# Patient Record
Sex: MALE | Race: WHITE | Employment: FULL TIME | URBAN - METROPOLITAN AREA
[De-identification: names, ages, dates, MRNs, and addresses within clinical notes are randomized per-mention and may not be internally consistent; named-entity substitution may affect disease eponyms.]

---

## 2021-06-06 ENCOUNTER — APPOINTMENT (OUTPATIENT)
Dept: GENERAL RADIOLOGY | Facility: CLINIC | Age: 61
DRG: 896 | End: 2021-06-06
Attending: EMERGENCY MEDICINE
Payer: COMMERCIAL

## 2021-06-06 ENCOUNTER — HOSPITAL ENCOUNTER (INPATIENT)
Facility: CLINIC | Age: 61
LOS: 1 days | Discharge: OTHER INSTITUTION | DRG: 896 | End: 2021-06-07
Attending: EMERGENCY MEDICINE | Admitting: INTERNAL MEDICINE
Payer: COMMERCIAL

## 2021-06-06 ENCOUNTER — APPOINTMENT (OUTPATIENT)
Dept: CT IMAGING | Facility: CLINIC | Age: 61
DRG: 896 | End: 2021-06-06
Attending: EMERGENCY MEDICINE
Payer: COMMERCIAL

## 2021-06-06 DIAGNOSIS — N17.9 ACUTE KIDNEY INJURY (H): ICD-10-CM

## 2021-06-06 DIAGNOSIS — R55 SYNCOPE, UNSPECIFIED SYNCOPE TYPE: ICD-10-CM

## 2021-06-06 DIAGNOSIS — J96.01 ACUTE RESPIRATORY FAILURE WITH HYPOXIA (H): ICD-10-CM

## 2021-06-06 DIAGNOSIS — F10.929 ALCOHOLIC INTOXICATION WITH COMPLICATION (H): ICD-10-CM

## 2021-06-06 DIAGNOSIS — J18.9 PNEUMONIA OF BOTH LOWER LOBES DUE TO INFECTIOUS ORGANISM: ICD-10-CM

## 2021-06-06 LAB
ALBUMIN SERPL-MCNC: 3.7 G/DL (ref 3.4–5)
ALP SERPL-CCNC: 69 U/L (ref 40–150)
ALT SERPL W P-5'-P-CCNC: 35 U/L (ref 0–70)
ANION GAP SERPL CALCULATED.3IONS-SCNC: 9 MMOL/L (ref 3–14)
AST SERPL W P-5'-P-CCNC: 26 U/L (ref 0–45)
BASOPHILS # BLD AUTO: 0 10E9/L (ref 0–0.2)
BASOPHILS NFR BLD AUTO: 0.5 %
BILIRUB SERPL-MCNC: 0.3 MG/DL (ref 0.2–1.3)
BUN SERPL-MCNC: 24 MG/DL (ref 7–30)
CALCIUM SERPL-MCNC: 8.6 MG/DL (ref 8.5–10.1)
CHLORIDE SERPL-SCNC: 100 MMOL/L (ref 94–109)
CK SERPL-CCNC: 304 U/L (ref 30–300)
CO2 SERPL-SCNC: 24 MMOL/L (ref 20–32)
CREAT SERPL-MCNC: 1.67 MG/DL (ref 0.66–1.25)
DIFFERENTIAL METHOD BLD: ABNORMAL
EOSINOPHIL # BLD AUTO: 0 10E9/L (ref 0–0.7)
EOSINOPHIL NFR BLD AUTO: 0.6 %
ERYTHROCYTE [DISTWIDTH] IN BLOOD BY AUTOMATED COUNT: 11.8 % (ref 10–15)
ETHANOL SERPL-MCNC: 0.35 G/DL
GFR SERPL CREATININE-BSD FRML MDRD: 43 ML/MIN/{1.73_M2}
GLUCOSE BLDC GLUCOMTR-MCNC: 115 MG/DL (ref 70–99)
GLUCOSE SERPL-MCNC: 117 MG/DL (ref 70–99)
HCT VFR BLD AUTO: 38.4 % (ref 40–53)
HGB BLD-MCNC: 13.4 G/DL (ref 13.3–17.7)
IMM GRANULOCYTES # BLD: 0 10E9/L (ref 0–0.4)
IMM GRANULOCYTES NFR BLD: 0.3 %
LACTATE BLD-SCNC: 2 MMOL/L (ref 0.7–2)
LYMPHOCYTES # BLD AUTO: 1.8 10E9/L (ref 0.8–5.3)
LYMPHOCYTES NFR BLD AUTO: 29.3 %
MAGNESIUM SERPL-MCNC: 2 MG/DL (ref 1.6–2.3)
MCH RBC QN AUTO: 32.5 PG (ref 26.5–33)
MCHC RBC AUTO-ENTMCNC: 34.9 G/DL (ref 31.5–36.5)
MCV RBC AUTO: 93 FL (ref 78–100)
MONOCYTES # BLD AUTO: 0.6 10E9/L (ref 0–1.3)
MONOCYTES NFR BLD AUTO: 9.9 %
NEUTROPHILS # BLD AUTO: 3.7 10E9/L (ref 1.6–8.3)
NEUTROPHILS NFR BLD AUTO: 59.4 %
NRBC # BLD AUTO: 0 10*3/UL
NRBC BLD AUTO-RTO: 0 /100
PLATELET # BLD AUTO: 214 10E9/L (ref 150–450)
POTASSIUM SERPL-SCNC: 3.7 MMOL/L (ref 3.4–5.3)
PROT SERPL-MCNC: 7 G/DL (ref 6.8–8.8)
RBC # BLD AUTO: 4.12 10E12/L (ref 4.4–5.9)
SODIUM SERPL-SCNC: 133 MMOL/L (ref 133–144)
TROPONIN I SERPL-MCNC: <0.015 UG/L (ref 0–0.04)
WBC # BLD AUTO: 6.3 10E9/L (ref 4–11)

## 2021-06-06 PROCEDURE — 999N001017 HC STATISTIC GLUCOSE BY METER IP

## 2021-06-06 PROCEDURE — 87635 SARS-COV-2 COVID-19 AMP PRB: CPT | Performed by: EMERGENCY MEDICINE

## 2021-06-06 PROCEDURE — 70450 CT HEAD/BRAIN W/O DYE: CPT

## 2021-06-06 PROCEDURE — 82550 ASSAY OF CK (CPK): CPT | Performed by: EMERGENCY MEDICINE

## 2021-06-06 PROCEDURE — 87040 BLOOD CULTURE FOR BACTERIA: CPT | Performed by: EMERGENCY MEDICINE

## 2021-06-06 PROCEDURE — 36415 COLL VENOUS BLD VENIPUNCTURE: CPT | Performed by: EMERGENCY MEDICINE

## 2021-06-06 PROCEDURE — 99285 EMERGENCY DEPT VISIT HI MDM: CPT | Mod: 25

## 2021-06-06 PROCEDURE — 71260 CT THORAX DX C+: CPT

## 2021-06-06 PROCEDURE — 83735 ASSAY OF MAGNESIUM: CPT | Performed by: EMERGENCY MEDICINE

## 2021-06-06 PROCEDURE — 85025 COMPLETE CBC W/AUTO DIFF WBC: CPT | Performed by: EMERGENCY MEDICINE

## 2021-06-06 PROCEDURE — 80053 COMPREHEN METABOLIC PANEL: CPT | Performed by: EMERGENCY MEDICINE

## 2021-06-06 PROCEDURE — 82077 ASSAY SPEC XCP UR&BREATH IA: CPT | Performed by: EMERGENCY MEDICINE

## 2021-06-06 PROCEDURE — 83605 ASSAY OF LACTIC ACID: CPT | Performed by: EMERGENCY MEDICINE

## 2021-06-06 PROCEDURE — 99223 1ST HOSP IP/OBS HIGH 75: CPT | Mod: AI | Performed by: INTERNAL MEDICINE

## 2021-06-06 PROCEDURE — C9803 HOPD COVID-19 SPEC COLLECT: HCPCS

## 2021-06-06 PROCEDURE — 96361 HYDRATE IV INFUSION ADD-ON: CPT

## 2021-06-06 PROCEDURE — 71045 X-RAY EXAM CHEST 1 VIEW: CPT

## 2021-06-06 PROCEDURE — 93005 ELECTROCARDIOGRAM TRACING: CPT

## 2021-06-06 PROCEDURE — 250N000009 HC RX 250: Performed by: EMERGENCY MEDICINE

## 2021-06-06 PROCEDURE — 83880 ASSAY OF NATRIURETIC PEPTIDE: CPT | Performed by: EMERGENCY MEDICINE

## 2021-06-06 PROCEDURE — 258N000003 HC RX IP 258 OP 636: Performed by: EMERGENCY MEDICINE

## 2021-06-06 PROCEDURE — 81001 URINALYSIS AUTO W/SCOPE: CPT | Performed by: EMERGENCY MEDICINE

## 2021-06-06 PROCEDURE — 84484 ASSAY OF TROPONIN QUANT: CPT | Performed by: EMERGENCY MEDICINE

## 2021-06-06 PROCEDURE — 250N000011 HC RX IP 250 OP 636: Performed by: EMERGENCY MEDICINE

## 2021-06-06 RX ORDER — AZITHROMYCIN 500 MG/5ML
500 INJECTION, POWDER, LYOPHILIZED, FOR SOLUTION INTRAVENOUS ONCE
Status: DISCONTINUED | OUTPATIENT
Start: 2021-06-06 | End: 2021-06-06

## 2021-06-06 RX ORDER — IOPAMIDOL 755 MG/ML
500 INJECTION, SOLUTION INTRAVASCULAR ONCE
Status: COMPLETED | OUTPATIENT
Start: 2021-06-06 | End: 2021-06-06

## 2021-06-06 RX ORDER — CEFTRIAXONE 2 G/1
2 INJECTION, POWDER, FOR SOLUTION INTRAMUSCULAR; INTRAVENOUS ONCE
Status: DISCONTINUED | OUTPATIENT
Start: 2021-06-06 | End: 2021-06-06

## 2021-06-06 RX ORDER — SODIUM CHLORIDE 9 MG/ML
INJECTION, SOLUTION INTRAVENOUS CONTINUOUS
Status: DISCONTINUED | OUTPATIENT
Start: 2021-06-06 | End: 2021-06-07 | Stop reason: HOSPADM

## 2021-06-06 RX ADMIN — SODIUM CHLORIDE 1000 ML: 9 INJECTION, SOLUTION INTRAVENOUS at 19:41

## 2021-06-06 RX ADMIN — SODIUM CHLORIDE 1000 ML: 9 INJECTION, SOLUTION INTRAVENOUS at 22:35

## 2021-06-06 RX ADMIN — SODIUM CHLORIDE 1000 ML: 9 INJECTION, SOLUTION INTRAVENOUS at 20:33

## 2021-06-06 RX ADMIN — IOPAMIDOL 100 ML: 755 INJECTION, SOLUTION INTRAVENOUS at 22:56

## 2021-06-06 RX ADMIN — SODIUM CHLORIDE 1000 ML: 9 INJECTION, SOLUTION INTRAVENOUS at 19:05

## 2021-06-06 RX ADMIN — SODIUM CHLORIDE 90 ML: 9 INJECTION, SOLUTION INTRAVENOUS at 22:58

## 2021-06-06 ASSESSMENT — ENCOUNTER SYMPTOMS: ACTIVITY CHANGE: 1

## 2021-06-06 NOTE — ED TRIAGE NOTES
Pt has been drinking today, had a box of wine and some vodka, girlfriend called EMS due to unresponsiveness. EMS states pt uncooperative at times. BS: 128. Slurred speech noted. ABC's intact, alert.

## 2021-06-06 NOTE — ED PROVIDER NOTES
History   Chief Complaint:  Alcohol Intoxication       HPI   Elfego Pickens is a 61 year old male with history of hypertension, hyperlipidemia, asthma, and diverticulosis who presents with alcohol intoxication. Patient has been drinking today with his friends. He had a box of wine and some vodka. He then became unresponsive so his wife called EMS. Here in the ED, patient denies any pain.    His wife later arrived and states that the patient has been out in the heat quite frequently over the last few days.  He has not been eating and drinking all that well outside of alcohol use.  They have had some stressful things going on recently.  The patient went out to celebrate successful house sale and likely drank too much according to his wife.  She brought him home and when getting out of the car, he had a syncopal episode and went to the ground.  He did not have any injuries from this.    Review of Systems   Constitutional: Positive for activity change. Negative for chills and fever.   Respiratory: Positive for cough.    Cardiovascular: Negative for chest pain.   Gastrointestinal: Negative for abdominal pain, diarrhea and vomiting.   Neurological: Positive for syncope. Negative for weakness, numbness and headaches.   Psychiatric/Behavioral: Positive for confusion.   All other systems reviewed and are negative.      Allergies:  No Known Allergies    Medications:  Zyrtec   ASA  Xarelto   Revatio   Flexeril   Norvasc  Neurontin   Zocor  Prinzide     Past Medical History:    Hypertension  Asthma   Hyperlipidemia   Diverticulosis   Obesity   Osteoarthritis     Past Surgical History:    Knee surgery   Vasectomy   Varicose vein   Corneal surgery     Social History:  Patient presents to the ED alone.  Patient lives at home with his wife.    Physical Exam     Patient Vitals for the past 24 hrs:   BP Pulse Resp SpO2   06/06/21 2300 90/52 79 -- --   06/06/21 2235 -- 76 12 95 %   06/06/21 2230 (!) 80/50 76 17 --   06/06/21 2200  (!) 72/44 73 14 (!) 89 %   06/06/21 2115 -- 76 11 91 %   06/06/21 2100 (!) 70/44 75 18 90 %   06/06/21 2045 (!) 74/40 75 29 90 %   06/06/21 2030 -- 78 21 97 %   06/06/21 2015 (!) 87/50 82 26 90 %   06/06/21 2000 (!) 72/36 78 27 98 %   06/06/21 1945 (!) 79/47 80 19 95 %   06/06/21 1930 (!) 81/41 79 22 94 %   06/06/21 1915 (!) 68/36 77 20 93 %   06/06/21 1900 (!) 72/42 81 20 --   06/06/21 1845 (!) 85/53 84 -- 91 %   06/06/21 1842 92/47 87 16 (!) 88 %       Physical Exam  Constitutional: Nontoxic appearing.  HEENT: Atraumatic.  PERRL.  EOMI.  Moist mucous membranes.  Neck: Soft.  Supple.  No JVD.  Cardiac: Regular rate and rhythm.  No murmur or rub.  Respiratory: Clear to auscultation bilaterally.  No respiratory distress.    Abdomen: Soft and nontender.  Nondistended.  Musculoskeletal: No edema.  Normal range of motion.  Neurologic: Alert and oriented to self and place but somewhat confused and appears intoxicated.  No facial drooping.  Normal speech.  5/5 strength in bilateral upper and lower extremities.  Sensation to light touch intact throughout.    Skin: No rashes.  No edema.  Psych: He appears intoxicated and somewhat agitated at time but calm is easily with redirection.  Does not appear to be responding to internal stimuli.  No thoughts of self-harm.      Emergency Department Course     ECG:  ECG taken at 1848, ECG read at 1858  Normal sinus rhythm.  Rate 83 bpm. AZ interval 190 ms. QRS duration 88 ms. QT/QTc 356/418 ms. P-R-T axes 50 32 28.     Imaging:  XR chest port 1 view:  Heart size and pulmonary vessels normal. Lung volumes are low clear. Reading per radiology.    CT chest abdomen pelvis with contrast:  1.  Mild dependent infiltrates at lung bases favored to be discoid atelectasis though mild pneumonia or developing aspiration possible.  2.  There is mild interstitial pulmonary edema seen at lung apices.  3.  Nothing obstructive or inflammatory involving bowel.  4.  Cholelithiasis. Reading per radiology.      CT head without contrast:  1. No acute intracranial process. Reading per radiology.     Laboratory:  CBC: WBC: 6.3, HGB: 13.4, PLT: 214  CMP: Glucose 117 (H), GFR: 43 (low), Creatinine: 1.67 (H) o/w WNL   Troponin (Collected 1907): <0.015  Lactic acid (Resulted 2050): 2.0  Glucose by meter (2008): 115 (H)  Magnesium: 2.0  CK total: 304 (H)  Alcohol ethyl: 0.35 (H!)  Blood Cultures x2: Pending    Emergency Department Course:    Reviewed:  nursing notes, vitals, past medical history and care everywhere    Assessments:    1850 Initial assessment     2130 I rechecked the patient and discussed the results of her workup thus far.     2336 I rechecked the patient and discussed the results of her workup thus far.     Consults:      I spoke with the hospitalist Dr. Blanco, who accepts the patient for admission.    Interventions:  1905 NS 1L IV Bolus   1941 NS 1L IV Bolus   2033 NS 1L IV Bolus   2235 NS 1L IV Bolus     Disposition:  The patient was admitted to the hospital under the care of Dr. Blanco.       Impression & Plan   Medical Decision Making:  Elfego Pickens is a 61-year-old man is afebrile but quite hypertensive.  A broad work-up was initiated and IV fluids were initiated.  He certainly intoxicated with a serum alcohol of 0.35.  He is actually awake answering questions.  He is somewhat confused initially, however, he sobered up quite significantly while in the emergency department and is answering questions appropriately but still mildly intoxicated.  His wife is present.  He has no pain or complaints at this time other than a slight cough over the last month been worsening.  He is also been in the heat and not eating and drinking very well, outside of his alcohol use.  Lab work-up is noted as above.  He has no leukocytosis.  He has no lactic acidosis.  His kidney function appears to have doubled from his last check a year ago.  He required 4 L of IV fluids and is now normotensive.  Do not believe he requires  pressor support at this time as he has no fever, leukocytosis, or tachycardia or lactic acidosis.  I did obtain CT scan imaging of his chest, abdomen, pelvis and head given the persistent hypotension.  There is potential aspiration pneumonia bilaterally in his lungs.  I discussed plan for IV antibiotics and admission to the hospital as he is requiring 2 L of nasal cannula oxygen.  He is in agreement with this plan.  I spoke with hospitalist service who accepts him to the observation unit and he was in stable condition at time of admission.    Diagnosis:    ICD-10-CM    1. Acute respiratory failure with hypoxia (H)  J96.01 Blood culture     Blood culture     UA with Microscopic     Nt probnp inpatient     Symptomatic SARS-CoV-2 COVID-19 Virus (Coronavirus) by PCR     Comprehensive metabolic panel     CBC with platelets   2. Pneumonia of both lower lobes due to infectious organism  J18.9 amoxicillin-clavulanate (AUGMENTIN) 875-125 MG tablet   3. Acute kidney injury (H)  N17.9    4. Syncope, unspecified syncope type  R55    5. Alcoholic intoxication with complication (H)  F10.929          Scribe Disclosure:  I, Pino Pickering, am serving as a scribe at 6:49 PM on 6/6/2021 to document services personally perfrmed by Alberto Barlow MD based on my observations and the provider's statements to me.              Alberto Barlow MD  06/07/21 [id number]

## 2021-06-07 VITALS
BODY MASS INDEX: 37.83 KG/M2 | DIASTOLIC BLOOD PRESSURE: 63 MMHG | HEART RATE: 95 BPM | RESPIRATION RATE: 20 BRPM | HEIGHT: 73 IN | WEIGHT: 285.4 LBS | SYSTOLIC BLOOD PRESSURE: 130 MMHG | OXYGEN SATURATION: 94 % | TEMPERATURE: 98.4 F

## 2021-06-07 PROBLEM — J18.9 PNEUMONIA OF BOTH LOWER LOBES DUE TO INFECTIOUS ORGANISM: Status: ACTIVE | Noted: 2021-06-07

## 2021-06-07 PROBLEM — N17.9 ACUTE KIDNEY INJURY (H): Status: ACTIVE | Noted: 2021-06-07

## 2021-06-07 PROBLEM — F10.929 ALCOHOLIC INTOXICATION WITH COMPLICATION (H): Status: ACTIVE | Noted: 2021-06-07

## 2021-06-07 PROBLEM — J96.01 ACUTE RESPIRATORY FAILURE WITH HYPOXIA (H): Status: ACTIVE | Noted: 2021-06-07

## 2021-06-07 PROBLEM — R55 SYNCOPE, UNSPECIFIED SYNCOPE TYPE: Status: ACTIVE | Noted: 2021-06-07

## 2021-06-07 LAB
ALBUMIN SERPL-MCNC: 3.2 G/DL (ref 3.4–5)
ALBUMIN UR-MCNC: NEGATIVE MG/DL
ALP SERPL-CCNC: 64 U/L (ref 40–150)
ALT SERPL W P-5'-P-CCNC: 31 U/L (ref 0–70)
ANION GAP SERPL CALCULATED.3IONS-SCNC: 8 MMOL/L (ref 3–14)
APPEARANCE UR: CLEAR
AST SERPL W P-5'-P-CCNC: 24 U/L (ref 0–45)
BILIRUB SERPL-MCNC: 0.3 MG/DL (ref 0.2–1.3)
BILIRUB UR QL STRIP: NEGATIVE
BUN SERPL-MCNC: 21 MG/DL (ref 7–30)
CALCIUM SERPL-MCNC: 7.7 MG/DL (ref 8.5–10.1)
CHLORIDE SERPL-SCNC: 112 MMOL/L (ref 94–109)
CO2 SERPL-SCNC: 22 MMOL/L (ref 20–32)
COLOR UR AUTO: ABNORMAL
CREAT SERPL-MCNC: 1.09 MG/DL (ref 0.66–1.25)
ERYTHROCYTE [DISTWIDTH] IN BLOOD BY AUTOMATED COUNT: 12 % (ref 10–15)
GFR SERPL CREATININE-BSD FRML MDRD: 72 ML/MIN/{1.73_M2}
GLUCOSE SERPL-MCNC: 116 MG/DL (ref 70–99)
GLUCOSE UR STRIP-MCNC: NEGATIVE MG/DL
HCT VFR BLD AUTO: 38 % (ref 40–53)
HGB BLD-MCNC: 13 G/DL (ref 13.3–17.7)
HGB UR QL STRIP: NEGATIVE
INTERPRETATION ECG - MUSE: NORMAL
KETONES UR STRIP-MCNC: NEGATIVE MG/DL
LABORATORY COMMENT REPORT: NORMAL
LEUKOCYTE ESTERASE UR QL STRIP: NEGATIVE
MCH RBC QN AUTO: 32.6 PG (ref 26.5–33)
MCHC RBC AUTO-ENTMCNC: 34.2 G/DL (ref 31.5–36.5)
MCV RBC AUTO: 95 FL (ref 78–100)
MUCOUS THREADS #/AREA URNS LPF: PRESENT /LPF
NITRATE UR QL: NEGATIVE
NT-PROBNP SERPL-MCNC: 185 PG/ML (ref 0–900)
PH UR STRIP: 5 PH (ref 5–7)
PLATELET # BLD AUTO: 152 10E9/L (ref 150–450)
POTASSIUM SERPL-SCNC: 4 MMOL/L (ref 3.4–5.3)
PROT SERPL-MCNC: 6.4 G/DL (ref 6.8–8.8)
RBC # BLD AUTO: 3.99 10E12/L (ref 4.4–5.9)
RBC #/AREA URNS AUTO: 0 /HPF (ref 0–2)
SARS-COV-2 RNA RESP QL NAA+PROBE: NEGATIVE
SODIUM SERPL-SCNC: 142 MMOL/L (ref 133–144)
SOURCE: ABNORMAL
SP GR UR STRIP: 1.02 (ref 1–1.03)
SPECIMEN SOURCE: NORMAL
UROBILINOGEN UR STRIP-MCNC: NORMAL MG/DL (ref 0–2)
WBC # BLD AUTO: 7.4 10E9/L (ref 4–11)
WBC #/AREA URNS AUTO: <1 /HPF (ref 0–5)

## 2021-06-07 PROCEDURE — 36415 COLL VENOUS BLD VENIPUNCTURE: CPT | Performed by: INTERNAL MEDICINE

## 2021-06-07 PROCEDURE — 250N000013 HC RX MED GY IP 250 OP 250 PS 637: Performed by: INTERNAL MEDICINE

## 2021-06-07 PROCEDURE — 96374 THER/PROPH/DIAG INJ IV PUSH: CPT | Mod: 59

## 2021-06-07 PROCEDURE — 80053 COMPREHEN METABOLIC PANEL: CPT | Performed by: INTERNAL MEDICINE

## 2021-06-07 PROCEDURE — 258N000003 HC RX IP 258 OP 636: Performed by: INTERNAL MEDICINE

## 2021-06-07 PROCEDURE — 120N000001 HC R&B MED SURG/OB

## 2021-06-07 PROCEDURE — 250N000011 HC RX IP 250 OP 636: Performed by: INTERNAL MEDICINE

## 2021-06-07 PROCEDURE — 250N000011 HC RX IP 250 OP 636: Performed by: EMERGENCY MEDICINE

## 2021-06-07 PROCEDURE — 99239 HOSP IP/OBS DSCHRG MGMT >30: CPT | Performed by: INTERNAL MEDICINE

## 2021-06-07 PROCEDURE — 85027 COMPLETE CBC AUTOMATED: CPT | Performed by: INTERNAL MEDICINE

## 2021-06-07 RX ORDER — MULTIPLE VITAMINS W/ MINERALS TAB 9MG-400MCG
1 TAB ORAL DAILY
Status: DISCONTINUED | OUTPATIENT
Start: 2021-06-07 | End: 2021-06-07 | Stop reason: HOSPADM

## 2021-06-07 RX ORDER — CYCLOBENZAPRINE HCL 10 MG
10 TABLET ORAL 3 TIMES DAILY PRN
COMMUNITY

## 2021-06-07 RX ORDER — LANOLIN ALCOHOL/MO/W.PET/CERES
100 CREAM (GRAM) TOPICAL 3 TIMES DAILY
Status: DISCONTINUED | OUTPATIENT
Start: 2021-06-09 | End: 2021-06-07 | Stop reason: HOSPADM

## 2021-06-07 RX ORDER — AMLODIPINE BESYLATE 10 MG/1
10 TABLET ORAL DAILY
Status: ON HOLD | COMMUNITY
End: 2021-11-27

## 2021-06-07 RX ORDER — HALOPERIDOL 5 MG/ML
2.5-5 INJECTION INTRAMUSCULAR EVERY 6 HOURS PRN
Status: DISCONTINUED | OUTPATIENT
Start: 2021-06-07 | End: 2021-06-07 | Stop reason: HOSPADM

## 2021-06-07 RX ORDER — LORAZEPAM 1 MG/1
1-2 TABLET ORAL EVERY 30 MIN PRN
Status: DISCONTINUED | OUTPATIENT
Start: 2021-06-07 | End: 2021-06-07 | Stop reason: HOSPADM

## 2021-06-07 RX ORDER — AZITHROMYCIN 250 MG/1
250 TABLET, FILM COATED ORAL DAILY
Status: DISCONTINUED | OUTPATIENT
Start: 2021-06-07 | End: 2021-06-07 | Stop reason: HOSPADM

## 2021-06-07 RX ORDER — SIMVASTATIN 20 MG
20 TABLET ORAL AT BEDTIME
COMMUNITY

## 2021-06-07 RX ORDER — DULOXETINE 40 MG/1
1 CAPSULE, DELAYED RELEASE ORAL DAILY
COMMUNITY

## 2021-06-07 RX ORDER — AZITHROMYCIN 500 MG/5ML
500 INJECTION, POWDER, LYOPHILIZED, FOR SOLUTION INTRAVENOUS ONCE
Status: COMPLETED | OUTPATIENT
Start: 2021-06-07 | End: 2021-06-07

## 2021-06-07 RX ORDER — FOLIC ACID 1 MG/1
1 TABLET ORAL DAILY
Status: DISCONTINUED | OUTPATIENT
Start: 2021-06-07 | End: 2021-06-07 | Stop reason: HOSPADM

## 2021-06-07 RX ORDER — LANOLIN ALCOHOL/MO/W.PET/CERES
200 CREAM (GRAM) TOPICAL 3 TIMES DAILY
Status: DISCONTINUED | OUTPATIENT
Start: 2021-06-07 | End: 2021-06-07 | Stop reason: HOSPADM

## 2021-06-07 RX ORDER — LANOLIN ALCOHOL/MO/W.PET/CERES
100 CREAM (GRAM) TOPICAL DAILY
Status: DISCONTINUED | OUTPATIENT
Start: 2021-06-14 | End: 2021-06-07 | Stop reason: HOSPADM

## 2021-06-07 RX ORDER — GABAPENTIN 300 MG/1
900 CAPSULE ORAL 3 TIMES DAILY
COMMUNITY

## 2021-06-07 RX ORDER — OLANZAPINE 5 MG/1
5-10 TABLET, ORALLY DISINTEGRATING ORAL EVERY 6 HOURS PRN
Status: DISCONTINUED | OUTPATIENT
Start: 2021-06-07 | End: 2021-06-07 | Stop reason: HOSPADM

## 2021-06-07 RX ORDER — ONDANSETRON 2 MG/ML
4 INJECTION INTRAMUSCULAR; INTRAVENOUS EVERY 6 HOURS PRN
Status: DISCONTINUED | OUTPATIENT
Start: 2021-06-07 | End: 2021-06-07 | Stop reason: HOSPADM

## 2021-06-07 RX ORDER — SILDENAFIL CITRATE 20 MG/1
10-100 TABLET ORAL
COMMUNITY

## 2021-06-07 RX ORDER — DIPHENHYDRAMINE HCL 25 MG
25 CAPSULE ORAL DAILY PRN
COMMUNITY

## 2021-06-07 RX ORDER — OMEGA-3 FATTY ACIDS/FISH OIL 300-1000MG
1 CAPSULE ORAL DAILY
COMMUNITY

## 2021-06-07 RX ORDER — LISINOPRIL AND HYDROCHLOROTHIAZIDE 20; 25 MG/1; MG/1
1 TABLET ORAL DAILY
COMMUNITY

## 2021-06-07 RX ORDER — FLUMAZENIL 0.1 MG/ML
0.2 INJECTION, SOLUTION INTRAVENOUS
Status: DISCONTINUED | OUTPATIENT
Start: 2021-06-07 | End: 2021-06-07 | Stop reason: HOSPADM

## 2021-06-07 RX ORDER — ONDANSETRON 4 MG/1
4 TABLET, ORALLY DISINTEGRATING ORAL EVERY 6 HOURS PRN
Status: DISCONTINUED | OUTPATIENT
Start: 2021-06-07 | End: 2021-06-07 | Stop reason: HOSPADM

## 2021-06-07 RX ORDER — LIDOCAINE 40 MG/G
CREAM TOPICAL
Status: DISCONTINUED | OUTPATIENT
Start: 2021-06-07 | End: 2021-06-07 | Stop reason: HOSPADM

## 2021-06-07 RX ORDER — LORAZEPAM 2 MG/ML
1-2 INJECTION INTRAMUSCULAR EVERY 30 MIN PRN
Status: DISCONTINUED | OUTPATIENT
Start: 2021-06-07 | End: 2021-06-07 | Stop reason: HOSPADM

## 2021-06-07 RX ORDER — METOPROLOL SUCCINATE 100 MG/1
150 TABLET, EXTENDED RELEASE ORAL DAILY
COMMUNITY

## 2021-06-07 RX ADMIN — Medication 5 MG: at 01:54

## 2021-06-07 RX ADMIN — FOLIC ACID 1 MG: 1 TABLET ORAL at 08:38

## 2021-06-07 RX ADMIN — THIAMINE HCL TAB 100 MG 200 MG: 100 TAB at 08:38

## 2021-06-07 RX ADMIN — TAZOBACTAM SODIUM AND PIPERACILLIN SODIUM 3.38 G: 375; 3 INJECTION, SOLUTION INTRAVENOUS at 05:57

## 2021-06-07 RX ADMIN — TAZOBACTAM SODIUM AND PIPERACILLIN SODIUM 4.5 G: 500; 4 INJECTION, SOLUTION INTRAVENOUS at 00:25

## 2021-06-07 RX ADMIN — TAZOBACTAM SODIUM AND PIPERACILLIN SODIUM 3.38 G: 375; 3 INJECTION, SOLUTION INTRAVENOUS at 11:56

## 2021-06-07 RX ADMIN — AZITHROMYCIN MONOHYDRATE 500 MG: 500 INJECTION, POWDER, LYOPHILIZED, FOR SOLUTION INTRAVENOUS at 01:54

## 2021-06-07 RX ADMIN — THIAMINE HCL TAB 100 MG 200 MG: 100 TAB at 14:40

## 2021-06-07 RX ADMIN — AZITHROMYCIN MONOHYDRATE 250 MG: 250 TABLET ORAL at 14:40

## 2021-06-07 RX ADMIN — MULTIPLE VITAMINS W/ MINERALS TAB 1 TABLET: TAB at 08:38

## 2021-06-07 RX ADMIN — SODIUM CHLORIDE: 9 INJECTION, SOLUTION INTRAVENOUS at 01:49

## 2021-06-07 ASSESSMENT — ENCOUNTER SYMPTOMS
HEADACHES: 0
ABDOMINAL PAIN: 0
NUMBNESS: 0
FEVER: 0
CONFUSION: 1
COUGH: 1
CHILLS: 0
WEAKNESS: 0
DIARRHEA: 0
VOMITING: 0

## 2021-06-07 ASSESSMENT — ACTIVITIES OF DAILY LIVING (ADL)
ADLS_ACUITY_SCORE: 17

## 2021-06-07 ASSESSMENT — MIFFLIN-ST. JEOR: SCORE: 2148.45

## 2021-06-07 NOTE — PHARMACY-ADMISSION MEDICATION HISTORY
Admission medication history interview status for this patient is complete. See [facility] admission navigator for allergy information, prior to admission medications and immunization status.     Medication history interview done, indicate source(s): Patient  Medication history resources (including written lists, pill bottles, clinic record): Called [facility], Care Everywhere   Pharmacy: [facility]rientos/[address], [facility] for discharge prescriptions    Changes made to PTA medication list:  Added: all  Deleted: none  Changed: none    Actions taken by pharmacist (provider contacted, etc):None     Additional medication history information: Patient is a reliable historian. Fill history available for all scheduled prescription medications except duloxetine; however, duloxetine has recent Rx order shown in chart notes 5/2021. Patient did not specify doses of supplements in interview. Confirmed with patient that Xarelto is no longer active - last fill date 5/2020 from [facility].     Medication reconciliation/reorder completed by provider prior to medication history?  N     Prior to Admission medications    Medication Sig Last Dose Taking? Auth Provider   amLODIPine (NORVASC) 10 MG tablet Take 10 mg by mouth daily 6/6/2021 at AM Yes Unknown, Entered By History   Ascorbic Acid (VITAMIN C PO) Take 1 tablet by mouth daily 6/6/2021 at AM Yes Unknown, Entered By History   B Complex Vitamins (B COMPLEX PO) Take 1 tablet by mouth daily 6/6/2021 at AM Yes Unknown, Entered By History   cyclobenzaprine (FLEXERIL) 10 MG tablet Take 10 mg by mouth 3 times daily as needed for muscle spasms Past Month at 2 weeks ago Yes Unknown, Entered By History   diphenhydrAMINE (BENADRYL) 25 MG capsule Take 25 mg by mouth daily as needed for itching or allergies 6/6/2021 at Unknown time Yes Unknown, Entered By History   DULoxetine HCl 40 MG CPEP Take 1 capsule by mouth daily 6/6/2021 at AM Yes Unknown, Entered By History   gabapentin (NEURONTIN) 300 MG capsule Take  900 mg by mouth 3 times daily 6/6/2021 at 1200 Yes Unknown, Entered By History   lisinopril-hydrochlorothiazide (ZESTORETIC) 20-25 MG tablet Take 1 tablet by mouth daily 6/6/2021 at AM Yes Unknown, Entered By History   metoprolol succinate ER (TOPROL-XL) 100 MG 24 hr tablet Take 150 mg by mouth daily 6/5/2021 at PM Yes Unknown, Entered By History   omega 3 1000 MG CAPS Take 1 capsule by mouth daily 6/6/2021 at AM Yes Unknown, Entered By History   sildenafil (REVATIO) 20 MG tablet Take  mg by mouth once as needed (30-60 minutes prior to sexual intercourse) 6/5/2021 at Unknown time Yes Unknown, Entered By History   simvastatin (ZOCOR) 20 MG tablet Take 20 mg by mouth At Bedtime 6/5/2021 at PM Yes Unknown, Entered By History   VITAMIN D PO Take 1 tablet by mouth daily 6/6/2021 at AM Yes Unknown, Entered By History   ZINC SULFATE PO Take 1 tablet by mouth daily 6/6/2021 at AM Yes Unknown, Entered By History       Laney Skinner  PGY-1 Pharmacy Practice Resident

## 2021-06-07 NOTE — UTILIZATION REVIEW
"  [facility] Utilization Review  Admission Status; Secondary Review Determination     Admission Date: 6/6/2021  6:41 PM      Under the authority of the Utilization Management Committee, the utilization review process indicated a secondary review on the above patient.  The review outcome is based on review of the medical records, discussions with staff, and applying clinical experience noted on the date of the review.        (X) Observation Status Appropriate - This patient does not meet hospital inpatient criteria and is placed in observation status. If this patient's primary payer is Medicare and was admitted as an inpatient, Condition Code 44 should be used and patient status changed to \"observation\".   () Observation Status concurrent Review           RATIONALE FOR DETERMINATION   62 yo M with h/o hypertension, hyperlipidemia, asthma, diverticulosis, admitted with alcohol intoxication.  Patient had been drinking wine and vodka, became unresponsive and wife called EMS.  Patient has been hypotensive with acute hypoxic respiratory failure.  Chest x-ray unremarkable, CT chest abdomen pelvis shows pneumonia versus aspiration, head CT unremarkable.  Patient was initially combative and confused, now clearing and cooperative.  Started on CIWA protocol, IV fluid hydration.  Patient has had productive cough for the past 2 to 3 months, started on IV Zosyn and azithromycin, also developed acute kidney injury with elevated creatinine of 1.6.  Patient has received 4 L normal saline with profound hypotension in the emergency room.  Blood pressures now improving, weaned off oxygen.  Patient was acutely ill on admission but quickly improved after overnight stay, now ready for discharge, recommend change to observation status after single midnight hospital stay, communicated to Dr. Sewell      The severity of illness, intensity of service provided, expected LOS make the care appropriate for observation status at this " time.        The information on this document is developed by the utilization review team in order for the business office to ensure compliance.  This only denotes the appropriateness of proper admission status and does not reflect the quality of care rendered.         The definitions of Inpatient Status and Observation Status used in making the determination above are those provided in the CMS Coverage Manual, Chapter 1 and Chapter 6, section 70.4.      Sincerely,       Kym Logan MD  Physician Advisor  Utilization Review-[place]    Phone: [phone]

## 2021-06-07 NOTE — H&P
[facility]  Hospitalist Admission Note  Name: Elfego Pickens    MRN: [id number]  YOB: 1960    Age: 61 year old  Date of admission: 6/6/2021  Primary care provider: [facility], [place]    Chief Complaint:  Hypotension, RAFAEL, confusion    Assessment and Plan:     Elfego Pickens is a 61 year old male with PMH including hypertension, neuropathy and hyperlipidemia who was admitted on 6/7/2021 with significant confusion and subsequent unresponsiveness and was found to have acute alcohol intoxication with profound hypotension, acute kidney injury and acute hypoxic respiratory failure due to possible pneumonia.    1.  Acute toxic/metabolic encephalopathy: Patient had been drinking heavily earlier today.  He was quite confused on passed out.  Does not recall what happened got him to the emergency room.  This is likely due to acute alcohol intoxication in addition to his hypoxic respiratory failure.  See below for specific treatments.  His mental status is already improved.    2.  Acute alcohol intoxication: As above drinking heavily on the day of admission.  Blood alcohol 0.35.  Initially was somewhat combative and confused.  He is now clearing and cooperative.  States that he drinks 2 to 4 glasses of wine several days a week.  Admits that he drank far too much today but was celebrating selling his house.  No prior history of alcohol withdrawal.  We will start vitamins and monitor on CIWA but will not start gabapentin or Depakote.    3.  Acute hypoxic respiratory failure: Patient has had 2 to 3 months of productive cough as well as some shortness of breath.  No fevers or chills or chest pain.  He attributed the symptoms to allergies.  He was found to have hypoxia and is requiring 2 L supplemental oxygen.  CT CAP shows mild dependent infiltrates at the lung bases which could be discoid atelectasis though mild pneumonia or developing aspiration pneumonia is possible.  There is also mild  interstitial pulmonary edema at the lung apices.  He was started on Zosyn in the emergency room.  I am also adding azithromycin.  Adding on a Procalcitonin.  -Continue Zosyn, start azithromycin  -Check Procalcitonin  -Consider TTE if he remains hypoxic tomorrow    4.  Acute kidney injury: Creatinine elevated at 1.67.  Creatinine was 0.7 in 1/2020.  He has been working outside the past few days and was drinking heavily today.  He had profound hypotension when he presented to the emergency room.  Suspect that this is acute kidney injury in the setting of prerenal azotemia.  It does appear that he is also on lisinopril and hydrochlorothiazide which could also be contributing.  He received 4 L normal saline in the emergency room.  -Normal saline at 125 cc/h  -BMP in the morning  -Avoid nephrotoxic agents    5.  Hypotension with history of hypertension: History of hypertension and appears to take lisinopril-hydrochlorothiazide as well as metoprolol, formal medication reconciliation is pending.  He has been taking these medications, took them this morning.  Was drinking heavily and outside a lot the past few days.  Suspect that he is dehydrated.  He was fluid resuscitated in the emergency room and blood pressure is improving.  His lactic acid is 2.  He is on antibiotics for suspected pneumonia as above.  At this time no reason for pressors as he is improving with volume resuscitation.  -Continue fluids  -Hold antihypertensive regimen    6.  Neuropathy: PT and gabapentin.  Formal medication reconciliation pending.  This can be resumed once verified.  He notes that he takes this somewhat on a as needed basis.    7.  Negative COVID-19: Received J&J COVID-19 vaccine approximately 6 weeks ago.  Test negative this admission.    Diet: Combination Diet Regular Diet Adult  DVT Prophylaxis: Pneumatic Compression Devices awaiting medication reconciliation, patient reports he is on a blood thinner but cannot recall the name  Ivanna  Catheter: not present  Code Status: Full Code    Disposition Plan   Expected discharge: Admit inpatient status, recommended to prior living arrangement once mental status at baseline and Hypoxia resolved.  Entered: Sandra Blanco MD 06/06/2021, 11:58 PM     The patient's care was discussed with the Bedside Nurse and Patient.    Sandra Blanco MD  [facility]      History of Present Illness:  Elfego Pickens is a 61 year old male with PMH including hypertension, neuropathy and hyperlipidemia who was admitted on 6/7/2021 with significant confusion and subsequent unresponsiveness and was found to have acute alcohol intoxication with profound hypotension, acute kidney injury and acute hypoxic respiratory failure due to possible pneumonia. History was obtained through patient interview, chart review and discussion with Dr. Barlow in the ER.    The patient reports that he has been very busy with work and trying to sell his house.  He sold his house today and was celebrating with his family.  He was drinking very heavily.  He recalls celebrating with his family and going to a restaurant and having more alcohol.  He does not recall anything after this and blacked out.  The next thing he recalls was a nurse taking care of him in the emergency room.    The patient's wife spoke with the ER provider, she is no longer here but he reportedly had an episode of unresponsiveness.  EMS was called and he was uncooperative at times.  His blood sugar was 128 and his speech was slurred.    He was initially placed on a health officer hold.  He was very confused and agitated but has cleared throughout his stay in the ER.  He was profoundly hypotensive and received 4 L IV fluid.  His blood pressure has improved but is still somewhat soft.  He was also found to be hypoxic and is requiring 2 L on supplemental oxygen.    He states that for approximately 2 to 3 months he been having a productive cough.  He has a lot of  sputum when he coughs.  He thought this was allergies.  He is also had some shortness of breath.  Denies fevers or chills.  No nausea or vomiting.  He has not had chest pain.  No swelling in his legs.  Denies abdominal pain.     Past Medical History:  No past medical history on file.  Past Surgical History:  No past surgical history on file.  Social History:  Social History     Tobacco Use     Smoking status: Not on file   Substance Use Topics     Alcohol use: Not on file     Social History     Social History Narrative     Not on file     Family History:  No family history on file.  Allergies:  No Known Allergies  Medications:  (Not in a hospital admission)    Review of Systems:  A Comprehensive greater than 10 system review of systems was carried out.  Pertinent positives and negatives are noted above.  Otherwise negative for contributory information.     Physical Exam:  Blood pressure 90/52, pulse 79, resp. rate 12, SpO2 95 %.  Wt Readings from Last 1 Encounters:   No data found for Wt     Exam:   General: Alert, awake, no acute distress.  HEENT: NC/AT, eyes anicteric and without injection, EOMI, face symmetric.  Dentition WNL, MMM.  Cardiac: RRR, normal S1, S2.  No murmurs/g/r.  No LE edema  Pulmonary: Normal chest rise, normal work of breathing.  Lungs CTAB without crackles or wheezing  Abdomen: soft, non-tender, non-distended.  Normoactive BS.  No guarding or rebound tenderness.  Extremities: no deformities.  Warm, well perfused.  Skin: no rashes or lesions noted.  Warm and Dry.  Neuro: No focal deficits noted.  Speech clear.  Coordination and strength grossly normal.  Psych: Appropriate affect. Alert and oriented x3    Data Reviewed Today:  Imaging:  Results for orders placed or performed during the hospital encounter of 06/06/21   XR Chest Port 1 View    Narrative    EXAM: XR CHEST PORT 1 VIEW  LOCATION: [facility]  DATE/TIME: 6/6/2021 8:21 PM    INDICATION: Confusion. Hypotension.  COMPARISON:  None.      Impression    IMPRESSION: Heart size and pulmonary vessels normal. Lung volumes are low clear.   CT Head w/o Contrast    Narrative    EXAM: CT HEAD W/O CONTRAST  LOCATION: [facility]  DATE/TIME: 6/6/2021 10:46 PM    INDICATION: Mental status change, unknown cause  COMPARISON: None.  TECHNIQUE: Routine CT Head without IV contrast. Multiplanar reformats. Dose reduction techniques were used.    FINDINGS:  INTRACRANIAL CONTENTS: No intracranial hemorrhage, extraaxial collection, or mass effect.  No CT evidence of acute infarct. Mild presumed chronic small vessel ischemic changes. Normal ventricles and sulci.     VISUALIZED ORBITS/SINUSES/MASTOIDS: No intraorbital abnormality. No paranasal sinus mucosal disease. No middle ear or mastoid effusion.    BONES/SOFT TISSUES: No acute abnormality.      Impression    IMPRESSION:  1.  No acute intracranial process.   CT Chest/Abdomen/Pelvis w Contrast    Narrative    EXAM: CT CHEST/ABDOMEN/PELVIS W CONTRAST  LOCATION: [facility]  DATE/TIME: 6/6/2021 10:46 PM    INDICATION: Hypotension. Confusion.  COMPARISON: None.  TECHNIQUE: CT scan of the chest, abdomen, and pelvis was performed following injection of IV contrast. Multiplanar reformats were obtained. Dose reduction techniques were used.   CONTRAST: 100mL Isovue-370    FINDINGS:   LUNGS AND PLEURA: Mild dependent infiltrate present at both bases likely represents atelectasis though aspiration would be possible. There is mild interstitial edema evident bilaterally at lung apices. Calcified granulomata left midlung.    MEDIASTINUM/AXILLAE: Normal. Heart size likely upper limits of normal.    CORONARY ARTERY CALCIFICATION: Moderate.    HEPATOBILIARY: Cholelithiasis, no active inflammation    PANCREAS: Normal.    SPLEEN: Normal.    ADRENAL GLANDS: Normal.    KIDNEYS/BLADDER: Small left cortical and parapelvic renal cysts. No stones or hydronephrosis.    BOWEL: Nothing obstructive or  inflammatory. Appendix normal.    LYMPH NODES: Normal.    VASCULATURE: Unremarkable.    PELVIC ORGANS: Normal.    MUSCULOSKELETAL: Flowing osteophytes over the low thoracic spine likely relates to DISH.      Impression    IMPRESSION:  1.  Mild dependent infiltrates at lung bases favored to be discoid atelectasis though mild pneumonia or developing aspiration possible.  2.  There is mild interstitial pulmonary edema seen at lung apices.  3.  Nothing obstructive or inflammatory involving bowel.  4.  Cholelithiasis.     Labs:  Recent Labs   Lab 06/06/21 1907   WBC 6.3   HGB 13.4   HCT 38.4*   MCV 93        Recent Labs   Lab 06/06/21 1907      POTASSIUM 3.7   CHLORIDE 100   CO2 24   ANIONGAP 9   *   BUN 24   CR 1.67*   GFRESTIMATED 43*   GFRESTBLACK 50*   MEENAKSHI 8.6     Recent Labs   Lab 06/06/21 2027   LACT 2.0     Recent Labs   Lab 06/06/21 1907   TROPI <0.015       Sandra Blanco MD  Hospitalist  [facility]

## 2021-06-07 NOTE — PLAN OF CARE
RN [id number]  Pt arrived to the floor at 0115. Ambulated to room SBA. Oriented to room, admission completed, POC reviewed. Pt adamant he would be leaving this AM despite education by nurse. Pt restless and impulsive at times. Forgetful, necessitating frequent reminders for IV occlusions and using the call light. Denies most symptoms associated w/ withdrawal, CIWA 6 and 3. Zosyn and zithromax given. NS @ 125 ml/hr. Will continue to monitor.

## 2021-06-07 NOTE — ED NOTES
[facility]  ED Nurse Handoff Report    Elfego Pickens is a 61 year old male   ED Chief complaint: Alcohol Intoxication  . ED Diagnosis:   Final diagnoses:   Acute respiratory failure with hypoxia (H)   Pneumonia of both lower lobes due to infectious organism   Acute kidney injury (H)   Syncope, unspecified syncope type   Alcoholic intoxication with complication (H)     Allergies: No Known Allergies    Code Status: Full Code  Activity level - Baseline/Home:  Independent. Activity Level - Current:   Stand by Assist. Lift room needed: No. Bariatric: No   Needed: No   Isolation: No. Infection: Not Applicable.     Vital Signs:   Vitals:    06/06/21 2300 06/06/21 2315 06/06/21 2330 06/06/21 2345   BP: 90/52 94/53 (!) 89/52 (!) 87/54   Pulse: 79 80 74 74   Resp:  13 16 18   SpO2:  90% 93% 90%       Cardiac Rhythm:  ,      Pain level:    Patient confused: Yes. Patient Falls Risk: Yes.   Elimination Status: Has voided   Patient Report - Initial Complaint: pt moving, doing a lot of work outside, lots of etoh. Found unresponsive, came in by ambulance . Focused Assessment: strong smell etoh wife here earlier, now up walkig to bathroom with assist of oneTreatments provided:fluidsFamily Comments: leftOBS brochure/video discussed/provided to patient:  no  ED Medications:   Medications   0.9% sodium chloride BOLUS (1,000 mLs Intravenous New Bag 6/6/21 2235)     Followed by   sodium chloride 0.9% infusion (has no administration in time range)   piperacillin-tazobactam (ZOSYN) intermittent infusion 4.5 g (has no administration in time range)   0.9% sodium chloride BOLUS (0 mLs Intravenous Stopped 6/6/21 1940)   0.9% sodium chloride BOLUS (0 mLs Intravenous Stopped 6/6/21 2033)   0.9% sodium chloride BOLUS (0 mLs Intravenous Stopped 6/6/21 2124)   Saline CT scan flush (90 mLs Intravenous Given 6/6/21 2258)   iopamidol (ISOVUE-370) solution 500 mL (100 mLs Intravenous Given 6/6/21 2256)     Drips infusing:   No  For the majority of the shift, the patient's behavior Yellow. Interventions performed were sitter and family for awhile.    Sepsis treatment initiated:no    Patient tested for COVID 19 prior to admission:yes  ED Nurse Name/Phone Number: Jaylyn Berman RN,   12:14 AM    RECEIVING UNIT ED HANDOFF REVIEW    Above ED Nurse Handoff Report was reviewed: Yes  Reviewed by: Fabby Balderas RN on June 7, 2021 at 12:58 AM

## 2021-06-07 NOTE — PLAN OF CARE
Day RN  Vss, 02 now stable on RA after walking and using the IS 5-10 times an hour.   Denied SOB, occasional nonproductive cough. RR 16-20  Denied pain  Ate and drank well.   Voiding clear tommy colored urine.   Zosyn and zithromax  Discharge probably later today.

## 2021-06-08 NOTE — DISCHARGE SUMMARY
Physician Discharge Summary     Name: Elfego Pickens    MRN: [id number]     YOB: 1960    Age: 61 year old                                             [facility]  Hospitalist Discharge Summary-[facility]      Primary care provider: [facility], [place]      Admit date:  6/6/2021      Discharge date and time: 6/7/2021  2:59 PM       Discharge Physician:  Eleazar Sewell MD      Primary Discharge Diagnosis      #1.  Acute toxic metabolic encephalopathy secondary to alcohol intoxication  #2.  Alcohol intoxication  #3.  Acute respiratory failure with hypoxia secondary to suspected aspiration  #4.  Suspected aspiration pneumonia  #5.  Acute kidney injury  #6.  Transient hypotension  #7.  Syncope      Secondary Diagnosis /chronic medical conditions       Hypertension    Neuropathy    Hyperlipidemia.             Brief Summary of Hospital stay :       Please refer to  Admission H&P note for full details of patient presentation.    Reason for Hospitalization(C/C,HPI and brief patient summary): Altered mental status        Significant findings(Primary diagnosis )Procedures and treatments provided(Hospital course ,consults, procedures):Please see bellow for details  Elfego Pickens is a 61 year old male with PMH including hypertension, neuropathy and hyperlipidemia who was admitted on 6/7/2021 with significant confusion and subsequent unresponsiveness and was found to have acute alcohol intoxication with profound hypotension, acute kidney injury and acute hypoxic respiratory failure due to possible pneumonia.  CT of the head was unremarkable for acute intracranial pathology.  CT of the chest showed evidence of mild dependent infiltrates at the lung bases suspicious for aspiration pneumonia.  There was incidental finding of cholelithiasis which patient is aware of and needs to follow-up with his primary care provider.    Patient's presentation is related to alcohol intoxication in the setting  "of dehydration due to excessive heat condition.  Patient denies dependence on alcohol that he drinks a glass of drinks every day.  He has no history of alcohol withdrawal seizures or alcohol treatment in the past.  He was celebrating with his family the fact that he was moving into the new house.  Patient was treated with intravenous IV fluid hydration.  His kidney function improved with hydration.  His blood pressure medications to be started and to follow with his primary physician.  His hypotension resolved.  EKG was normal sinus rhythm and his syncope is most likely related to hypotension from dehydration and alcohol intoxication.    Patient was treated with IV Zosyn in the hospital and was transitioned to oral Augmentin and plan to follow-up with his primary provider.  On the day of discharge patient is comfortable alert and oriented x3.  He was saturating well on room air.  He understood the plan of care and received written discharge instruction on discharge.            Patient discharge Condition:     stable    /63   Pulse 95   Temp 98.4  F (36.9  C) (Temporal)   Resp 20   Ht 1.854 m (6' 1\")   Wt 129.5 kg (285 lb 6.4 oz)   SpO2 94%   BMI 37.65 kg/m       Patient was alert and oriented x3.  Ambulatory.  Able to tolerate her home oxygen        Discharge Instructions:       Patient/family instructions: Written discharge instruction given to patient/family    Discharge Medications:       Review of your medicines      UNREVIEWED medicines. Ask your doctor about these medicines      Dose / Directions   amLODIPine 10 MG tablet  Commonly known as: NORVASC      Dose: 10 mg  Take 10 mg by mouth daily  Refills: 0     B COMPLEX PO      Dose: 1 tablet  Take 1 tablet by mouth daily  Refills: 0     cyclobenzaprine 10 MG tablet  Commonly known as: FLEXERIL      Dose: 10 mg  Take 10 mg by mouth 3 times daily as needed for muscle spasms  Refills: 0     diphenhydrAMINE 25 MG capsule  Commonly known as: BENADRYL   "    Dose: 25 mg  Take 25 mg by mouth daily as needed for itching or allergies  Refills: 0     DULoxetine HCl 40 MG Cpep      Dose: 1 capsule  Take 1 capsule by mouth daily  Refills: 0     gabapentin 300 MG capsule  Commonly known as: NEURONTIN      Dose: 900 mg  Take 900 mg by mouth 3 times daily  Refills: 0     lisinopril-hydrochlorothiazide 20-25 MG tablet  Commonly known as: ZESTORETIC      Dose: 1 tablet  Take 1 tablet by mouth daily  Refills: 0     metoprolol succinate  MG 24 hr tablet  Commonly known as: TOPROL-XL      Dose: 150 mg  Take 150 mg by mouth daily  Refills: 0     sildenafil 20 MG tablet  Commonly known as: REVATIO      Dose:  mg  Take  mg by mouth once as needed (30-60 minutes prior to sexual intercourse)  Refills: 0     simvastatin 20 MG tablet  Commonly known as: ZOCOR      Dose: 20 mg  Take 20 mg by mouth At Bedtime  Refills: 0     VITAMIN C PO      Dose: 1 tablet  Take 1 tablet by mouth daily  Refills: 0     VITAMIN D PO      Dose: 1 tablet  Take 1 tablet by mouth daily  Refills: 0     ZINC SULFATE PO      Dose: 1 tablet  Take 1 tablet by mouth daily  Refills: 0        START taking      Dose / Directions   amoxicillin-clavulanate 875-125 MG tablet  Commonly known as: AUGMENTIN  Used for: Pneumonia of both lower lobes due to infectious organism      Dose: 1 tablet  Take 1 tablet by mouth 2 times daily for 10 days  Quantity: 20 tablet  Refills: 0        CONTINUE these medicines which have NOT CHANGED      Dose / Directions   omega 3 1000 MG Caps      Dose: 1 capsule  Take 1 capsule by mouth daily  Refills: 0           Where to get your medicines      These medications were sent to [facility] [address] [address] [address]    Phone: [phone]     amoxicillin-clavulanate 875-125 MG tablet          Discharge diet:Orders Placed This Encounter      Diet    cardiac diet      Discharge activity:Activity as  tolerated      Discharge follow-up:    Follow up with primary care provider in 7 days or earlier if symptoms return or gets worse.    Follow up with consultant as instructed       Other instructions:    We discussed with patient/family about detail discharge instructions as well as discharge medications above including potential risks,side effects and benefits.Patient/family understood benefits and potential serious side effects of taking these medications and need to follow up with PCP if the patient develops complications.  Patient is also advised to see a doctor immediately for severe symptoms.        Major procedure performed/  Significant Diagnostic Studies:            Results for orders placed or performed during the hospital encounter of 06/06/21   XR Chest Port 1 View    Narrative    EXAM: XR CHEST PORT 1 VIEW  LOCATION: [facility]  DATE/TIME: 6/6/2021 8:21 PM    INDICATION: Confusion. Hypotension.  COMPARISON: None.      Impression    IMPRESSION: Heart size and pulmonary vessels normal. Lung volumes are low clear.   CT Head w/o Contrast    Narrative    EXAM: CT HEAD W/O CONTRAST  LOCATION: [facility]  DATE/TIME: 6/6/2021 10:46 PM    INDICATION: Mental status change, unknown cause  COMPARISON: None.  TECHNIQUE: Routine CT Head without IV contrast. Multiplanar reformats. Dose reduction techniques were used.    FINDINGS:  INTRACRANIAL CONTENTS: No intracranial hemorrhage, extraaxial collection, or mass effect.  No CT evidence of acute infarct. Mild presumed chronic small vessel ischemic changes. Normal ventricles and sulci.     VISUALIZED ORBITS/SINUSES/MASTOIDS: No intraorbital abnormality. No paranasal sinus mucosal disease. No middle ear or mastoid effusion.    BONES/SOFT TISSUES: No acute abnormality.      Impression    IMPRESSION:  1.  No acute intracranial process.   CT Chest/Abdomen/Pelvis w Contrast    Narrative    EXAM: CT CHEST/ABDOMEN/PELVIS W CONTRAST  LOCATION: [facility]  DATE/TIME: 6/6/2021 10:46 PM    INDICATION: Hypotension. Confusion.  COMPARISON: None.  TECHNIQUE: CT scan of the chest, abdomen, and pelvis was performed following injection of IV contrast. Multiplanar reformats were obtained. Dose reduction techniques were used.   CONTRAST: 100mL Isovue-370    FINDINGS:   LUNGS AND PLEURA: Mild dependent infiltrate present at both bases likely represents atelectasis though aspiration would be possible. There is mild interstitial edema evident bilaterally at lung apices. Calcified granulomata left midlung.    MEDIASTINUM/AXILLAE: Normal. Heart size likely upper limits of normal.    CORONARY ARTERY CALCIFICATION: Moderate.    HEPATOBILIARY: Cholelithiasis, no active inflammation    PANCREAS: Normal.    SPLEEN: Normal.    ADRENAL GLANDS: Normal.    KIDNEYS/BLADDER: Small left cortical and parapelvic renal cysts. No stones or hydronephrosis.    BOWEL: Nothing obstructive or inflammatory. Appendix normal.    LYMPH NODES: Normal.    VASCULATURE: Unremarkable.    PELVIC ORGANS: Normal.    MUSCULOSKELETAL: Flowing osteophytes over the low thoracic spine likely relates to DISH.      Impression    IMPRESSION:  1.  Mild dependent infiltrates at lung bases favored to be discoid atelectasis though mild pneumonia or developing aspiration possible.  2.  There is mild interstitial pulmonary edema seen at lung apices.  3.  Nothing obstructive or inflammatory involving bowel.  4.  Cholelithiasis.       Recent Labs   Lab 06/07/21 0611 06/06/21 1907   WBC 7.4 6.3   HGB 13.0* 13.4   HCT 38.0* 38.4*   MCV 95 93    214     Recent Labs   Lab 06/06/21 2035 06/06/21 2027   CULT No growth after 1 day No growth after 1 day     Recent Labs   Lab 06/07/21 0611 06/06/21  1907    133   POTASSIUM 4.0 3.7   CHLORIDE 112* 100   CO2 22 24   ANIONGAP 8 9   * 117*   BUN 21 24   CR 1.09 1.67*   GFRESTIMATED 72 43*   GFRESTBLACK 84 50*   MEENAKSHI 7.7* 8.6   MAG  --  2.0   PROTTOTAL  6.4* 7.0   ALBUMIN 3.2* 3.7   BILITOTAL 0.3 0.3   ALKPHOS 64 69   AST 24 26   ALT 31 35       Recent Labs   Lab 06/07/21  0611 06/06/21 2008 06/06/21  1907   *  --  117*   BGM  --  115*  --        No results for input(s): INR in the last 168 hours.      Incidental findings that was discussed with patient/family and need follow up with PCP: Cholelithiasis    Pending Results:       Unresulted Labs Ordered in the Past 30 Days of this Admission     Date and Time Order Name Status Description    6/6/2021 2026 Blood culture Preliminary     6/6/2021 2008 Blood culture Preliminary              Patient Allergies:       No Known Allergies      Disposition:     Disposition: home        I saw and evaluated the patient on day of discharge and  discharge instructions reviewed  and  all the patient's questions and concerns addressed. Over 30 minutes spent on discharge and coordination of discharge process for this patient.      Disclaimer: This note consists of symbols derived from keyboarding, dictation and/or voice recognition software. As a result, there may be errors in the script that have gone undetected. Please consider this when interpreting information found in this chart

## 2021-06-13 LAB
BACTERIA SPEC CULT: NO GROWTH
BACTERIA SPEC CULT: NO GROWTH
SPECIMEN SOURCE: NORMAL
SPECIMEN SOURCE: NORMAL

## 2021-11-25 ENCOUNTER — APPOINTMENT (OUTPATIENT)
Dept: ULTRASOUND IMAGING | Facility: CLINIC | Age: 61
DRG: 446 | End: 2021-11-25
Attending: EMERGENCY MEDICINE
Payer: COMMERCIAL

## 2021-11-25 ENCOUNTER — APPOINTMENT (OUTPATIENT)
Dept: CT IMAGING | Facility: CLINIC | Age: 61
DRG: 446 | End: 2021-11-25
Attending: EMERGENCY MEDICINE
Payer: COMMERCIAL

## 2021-11-25 ENCOUNTER — HOSPITAL ENCOUNTER (OUTPATIENT)
Facility: CLINIC | Age: 61
Setting detail: OBSERVATION
Discharge: LEFT BEFORE DISCHARGE | DRG: 446 | End: 2021-11-27
Attending: EMERGENCY MEDICINE | Admitting: INTERNAL MEDICINE
Payer: COMMERCIAL

## 2021-11-25 ENCOUNTER — APPOINTMENT (OUTPATIENT)
Dept: GENERAL RADIOLOGY | Facility: CLINIC | Age: 61
DRG: 446 | End: 2021-11-25
Attending: EMERGENCY MEDICINE
Payer: COMMERCIAL

## 2021-11-25 DIAGNOSIS — K83.1 BILIARY OBSTRUCTION (H): ICD-10-CM

## 2021-11-25 DIAGNOSIS — K81.0 ACUTE CHOLECYSTITIS: Primary | ICD-10-CM

## 2021-11-25 DIAGNOSIS — I10 HYPERTENSION, UNSPECIFIED TYPE: ICD-10-CM

## 2021-11-25 LAB
ALBUMIN SERPL-MCNC: 3.8 G/DL (ref 3.4–5)
ALP SERPL-CCNC: 162 U/L (ref 40–150)
ALT SERPL W P-5'-P-CCNC: 508 U/L (ref 0–70)
ANION GAP SERPL CALCULATED.3IONS-SCNC: 9 MMOL/L (ref 3–14)
AST SERPL W P-5'-P-CCNC: 1014 U/L (ref 0–45)
BASOPHILS # BLD AUTO: 0 10E3/UL (ref 0–0.2)
BASOPHILS NFR BLD AUTO: 0 %
BILIRUB SERPL-MCNC: 2.5 MG/DL (ref 0.2–1.3)
BUN SERPL-MCNC: 14 MG/DL (ref 7–30)
CALCIUM SERPL-MCNC: 9.1 MG/DL (ref 8.5–10.1)
CHLORIDE BLD-SCNC: 100 MMOL/L (ref 94–109)
CO2 SERPL-SCNC: 26 MMOL/L (ref 20–32)
CREAT SERPL-MCNC: 0.88 MG/DL (ref 0.66–1.25)
EOSINOPHIL # BLD AUTO: 0 10E3/UL (ref 0–0.7)
EOSINOPHIL NFR BLD AUTO: 0 %
ERYTHROCYTE [DISTWIDTH] IN BLOOD BY AUTOMATED COUNT: 11.5 % (ref 10–15)
FLUAV RNA SPEC QL NAA+PROBE: NEGATIVE
FLUBV RNA RESP QL NAA+PROBE: NEGATIVE
GFR SERPL CREATININE-BSD FRML MDRD: >90 ML/MIN/1.73M2
GLUCOSE BLD-MCNC: 162 MG/DL (ref 70–99)
HCT VFR BLD AUTO: 40.5 % (ref 40–53)
HGB BLD-MCNC: 13.7 G/DL (ref 13.3–17.7)
HOLD SPECIMEN: NORMAL
HOLD SPECIMEN: NORMAL
IMM GRANULOCYTES # BLD: 0.1 10E3/UL
IMM GRANULOCYTES NFR BLD: 0 %
LIPASE SERPL-CCNC: 365 U/L (ref 73–393)
LYMPHOCYTES # BLD AUTO: 0.6 10E3/UL (ref 0.8–5.3)
LYMPHOCYTES NFR BLD AUTO: 4 %
MCH RBC QN AUTO: 32.6 PG (ref 26.5–33)
MCHC RBC AUTO-ENTMCNC: 33.8 G/DL (ref 31.5–36.5)
MCV RBC AUTO: 96 FL (ref 78–100)
MONOCYTES # BLD AUTO: 0.4 10E3/UL (ref 0–1.3)
MONOCYTES NFR BLD AUTO: 2 %
NEUTROPHILS # BLD AUTO: 14.9 10E3/UL (ref 1.6–8.3)
NEUTROPHILS NFR BLD AUTO: 94 %
NRBC # BLD AUTO: 0 10E3/UL
NRBC BLD AUTO-RTO: 0 /100
PLATELET # BLD AUTO: 216 10E3/UL (ref 150–450)
POTASSIUM BLD-SCNC: 3.7 MMOL/L (ref 3.4–5.3)
PROT SERPL-MCNC: 7.5 G/DL (ref 6.8–8.8)
RBC # BLD AUTO: 4.2 10E6/UL (ref 4.4–5.9)
SARS-COV-2 RNA RESP QL NAA+PROBE: NEGATIVE
SODIUM SERPL-SCNC: 135 MMOL/L (ref 133–144)
TROPONIN I SERPL HS-MCNC: 5 NG/L
TROPONIN I SERPL-MCNC: <0.015 UG/L (ref 0–0.04)
WBC # BLD AUTO: 15.9 10E3/UL (ref 4–11)

## 2021-11-25 PROCEDURE — 84484 ASSAY OF TROPONIN QUANT: CPT | Performed by: EMERGENCY MEDICINE

## 2021-11-25 PROCEDURE — 250N000011 HC RX IP 250 OP 636: Performed by: EMERGENCY MEDICINE

## 2021-11-25 PROCEDURE — 85004 AUTOMATED DIFF WBC COUNT: CPT | Performed by: EMERGENCY MEDICINE

## 2021-11-25 PROCEDURE — 36415 COLL VENOUS BLD VENIPUNCTURE: CPT | Performed by: EMERGENCY MEDICINE

## 2021-11-25 PROCEDURE — 96361 HYDRATE IV INFUSION ADD-ON: CPT

## 2021-11-25 PROCEDURE — 87636 SARSCOV2 & INF A&B AMP PRB: CPT | Performed by: EMERGENCY MEDICINE

## 2021-11-25 PROCEDURE — 93005 ELECTROCARDIOGRAM TRACING: CPT

## 2021-11-25 PROCEDURE — 96374 THER/PROPH/DIAG INJ IV PUSH: CPT | Mod: 59

## 2021-11-25 PROCEDURE — 250N000009 HC RX 250: Performed by: EMERGENCY MEDICINE

## 2021-11-25 PROCEDURE — 71046 X-RAY EXAM CHEST 2 VIEWS: CPT

## 2021-11-25 PROCEDURE — 258N000003 HC RX IP 258 OP 636: Performed by: EMERGENCY MEDICINE

## 2021-11-25 PROCEDURE — 96375 TX/PRO/DX INJ NEW DRUG ADDON: CPT

## 2021-11-25 PROCEDURE — 83690 ASSAY OF LIPASE: CPT | Performed by: EMERGENCY MEDICINE

## 2021-11-25 PROCEDURE — 82077 ASSAY SPEC XCP UR&BREATH IA: CPT | Performed by: INTERNAL MEDICINE

## 2021-11-25 PROCEDURE — 82040 ASSAY OF SERUM ALBUMIN: CPT | Performed by: EMERGENCY MEDICINE

## 2021-11-25 PROCEDURE — C9803 HOPD COVID-19 SPEC COLLECT: HCPCS

## 2021-11-25 PROCEDURE — 99223 1ST HOSP IP/OBS HIGH 75: CPT | Mod: AI | Performed by: INTERNAL MEDICINE

## 2021-11-25 PROCEDURE — 82550 ASSAY OF CK (CPK): CPT | Performed by: INTERNAL MEDICINE

## 2021-11-25 PROCEDURE — 99285 EMERGENCY DEPT VISIT HI MDM: CPT | Mod: 25

## 2021-11-25 PROCEDURE — 74177 CT ABD & PELVIS W/CONTRAST: CPT

## 2021-11-25 PROCEDURE — 80053 COMPREHEN METABOLIC PANEL: CPT | Performed by: EMERGENCY MEDICINE

## 2021-11-25 PROCEDURE — 76705 ECHO EXAM OF ABDOMEN: CPT

## 2021-11-25 RX ORDER — LORAZEPAM 1 MG/1
1 TABLET ORAL ONCE
Status: DISCONTINUED | OUTPATIENT
Start: 2021-11-25 | End: 2021-11-25

## 2021-11-25 RX ORDER — IOPAMIDOL 755 MG/ML
500 INJECTION, SOLUTION INTRAVASCULAR ONCE
Status: COMPLETED | OUTPATIENT
Start: 2021-11-25 | End: 2021-11-25

## 2021-11-25 RX ORDER — ONDANSETRON 2 MG/ML
4 INJECTION INTRAMUSCULAR; INTRAVENOUS EVERY 30 MIN PRN
Status: DISCONTINUED | OUTPATIENT
Start: 2021-11-25 | End: 2021-11-26

## 2021-11-25 RX ADMIN — SODIUM CHLORIDE 1000 ML: 9 INJECTION, SOLUTION INTRAVENOUS at 19:27

## 2021-11-25 RX ADMIN — SODIUM CHLORIDE 65 ML: 9 INJECTION, SOLUTION INTRAVENOUS at 20:42

## 2021-11-25 RX ADMIN — FAMOTIDINE 20 MG: 10 INJECTION, SOLUTION INTRAVENOUS at 19:27

## 2021-11-25 RX ADMIN — ONDANSETRON 4 MG: 2 INJECTION INTRAMUSCULAR; INTRAVENOUS at 19:27

## 2021-11-25 RX ADMIN — IOPAMIDOL 100 ML: 755 INJECTION, SOLUTION INTRAVENOUS at 20:42

## 2021-11-25 ASSESSMENT — ENCOUNTER SYMPTOMS
HEADACHES: 1
NAUSEA: 1
SORE THROAT: 0
ABDOMINAL PAIN: 1

## 2021-11-26 ENCOUNTER — PREP FOR PROCEDURE (OUTPATIENT)
Dept: SURGERY | Facility: CLINIC | Age: 61
End: 2021-11-26
Payer: COMMERCIAL

## 2021-11-26 ENCOUNTER — APPOINTMENT (OUTPATIENT)
Dept: MRI IMAGING | Facility: CLINIC | Age: 61
DRG: 446 | End: 2021-11-26
Attending: INTERNAL MEDICINE
Payer: COMMERCIAL

## 2021-11-26 DIAGNOSIS — K80.50 CHOLEDOCHOLITHIASIS: Primary | ICD-10-CM

## 2021-11-26 PROBLEM — K83.1 BILIARY OBSTRUCTION (H): Status: ACTIVE | Noted: 2021-11-26

## 2021-11-26 LAB
ATRIAL RATE - MUSE: 109 BPM
CK SERPL-CCNC: 138 U/L (ref 30–300)
DIASTOLIC BLOOD PRESSURE - MUSE: NORMAL MMHG
ETHANOL SERPL-MCNC: 0.08 G/DL
INTERPRETATION ECG - MUSE: NORMAL
MAGNESIUM SERPL-MCNC: 1.7 MG/DL (ref 1.6–2.3)
P AXIS - MUSE: 61 DEGREES
PHOSPHATE SERPL-MCNC: 4.2 MG/DL (ref 2.5–4.5)
PR INTERVAL - MUSE: 190 MS
QRS DURATION - MUSE: 74 MS
QT - MUSE: 312 MS
QTC - MUSE: 420 MS
R AXIS - MUSE: 63 DEGREES
SYSTOLIC BLOOD PRESSURE - MUSE: NORMAL MMHG
T AXIS - MUSE: 48 DEGREES
VENTRICULAR RATE- MUSE: 109 BPM

## 2021-11-26 PROCEDURE — 87040 BLOOD CULTURE FOR BACTERIA: CPT | Performed by: INTERNAL MEDICINE

## 2021-11-26 PROCEDURE — 99222 1ST HOSP IP/OBS MODERATE 55: CPT | Performed by: SURGERY

## 2021-11-26 PROCEDURE — 74183 MRI ABD W/O CNTR FLWD CNTR: CPT

## 2021-11-26 PROCEDURE — A9585 GADOBUTROL INJECTION: HCPCS | Performed by: INTERNAL MEDICINE

## 2021-11-26 PROCEDURE — 96376 TX/PRO/DX INJ SAME DRUG ADON: CPT | Mod: 59

## 2021-11-26 PROCEDURE — 250N000011 HC RX IP 250 OP 636: Performed by: EMERGENCY MEDICINE

## 2021-11-26 PROCEDURE — 120N000004 HC R&B MS OVERFLOW

## 2021-11-26 PROCEDURE — G0378 HOSPITAL OBSERVATION PER HR: HCPCS

## 2021-11-26 PROCEDURE — 258N000003 HC RX IP 258 OP 636: Performed by: INTERNAL MEDICINE

## 2021-11-26 PROCEDURE — 84100 ASSAY OF PHOSPHORUS: CPT | Performed by: INTERNAL MEDICINE

## 2021-11-26 PROCEDURE — 255N000002 HC RX 255 OP 636: Performed by: INTERNAL MEDICINE

## 2021-11-26 PROCEDURE — 250N000011 HC RX IP 250 OP 636: Performed by: INTERNAL MEDICINE

## 2021-11-26 PROCEDURE — 83735 ASSAY OF MAGNESIUM: CPT | Performed by: INTERNAL MEDICINE

## 2021-11-26 PROCEDURE — 250N000011 HC RX IP 250 OP 636: Performed by: PHYSICIAN ASSISTANT

## 2021-11-26 PROCEDURE — 36415 COLL VENOUS BLD VENIPUNCTURE: CPT | Performed by: INTERNAL MEDICINE

## 2021-11-26 PROCEDURE — 96375 TX/PRO/DX INJ NEW DRUG ADDON: CPT | Mod: 59

## 2021-11-26 PROCEDURE — 250N000013 HC RX MED GY IP 250 OP 250 PS 637: Performed by: INTERNAL MEDICINE

## 2021-11-26 PROCEDURE — 258N000003 HC RX IP 258 OP 636: Performed by: PHYSICIAN ASSISTANT

## 2021-11-26 RX ORDER — DULOXETIN HYDROCHLORIDE 20 MG/1
40 CAPSULE, DELAYED RELEASE ORAL DAILY
Status: DISCONTINUED | OUTPATIENT
Start: 2021-11-27 | End: 2021-11-27 | Stop reason: HOSPADM

## 2021-11-26 RX ORDER — AMOXICILLIN 250 MG
1 CAPSULE ORAL 2 TIMES DAILY PRN
Status: DISCONTINUED | OUTPATIENT
Start: 2021-11-26 | End: 2021-11-27 | Stop reason: HOSPADM

## 2021-11-26 RX ORDER — NALOXONE HYDROCHLORIDE 0.4 MG/ML
0.4 INJECTION, SOLUTION INTRAMUSCULAR; INTRAVENOUS; SUBCUTANEOUS
Status: DISCONTINUED | OUTPATIENT
Start: 2021-11-26 | End: 2021-11-27 | Stop reason: HOSPADM

## 2021-11-26 RX ORDER — SODIUM CHLORIDE, SODIUM LACTATE, POTASSIUM CHLORIDE, CALCIUM CHLORIDE 600; 310; 30; 20 MG/100ML; MG/100ML; MG/100ML; MG/100ML
INJECTION, SOLUTION INTRAVENOUS CONTINUOUS
Status: DISCONTINUED | OUTPATIENT
Start: 2021-11-26 | End: 2021-11-27

## 2021-11-26 RX ORDER — ACETAMINOPHEN 325 MG/1
650 TABLET ORAL EVERY 6 HOURS PRN
Status: DISCONTINUED | OUTPATIENT
Start: 2021-11-26 | End: 2021-11-27 | Stop reason: HOSPADM

## 2021-11-26 RX ORDER — AMOXICILLIN 250 MG
2 CAPSULE ORAL 2 TIMES DAILY PRN
Status: DISCONTINUED | OUTPATIENT
Start: 2021-11-26 | End: 2021-11-27 | Stop reason: HOSPADM

## 2021-11-26 RX ORDER — AMLODIPINE BESYLATE 10 MG/1
10 TABLET ORAL DAILY
Status: DISCONTINUED | OUTPATIENT
Start: 2021-11-26 | End: 2021-11-27 | Stop reason: HOSPADM

## 2021-11-26 RX ORDER — LISINOPRIL AND HYDROCHLOROTHIAZIDE 20; 25 MG/1; MG/1
1 TABLET ORAL DAILY
Status: DISCONTINUED | OUTPATIENT
Start: 2021-11-26 | End: 2021-11-27 | Stop reason: HOSPADM

## 2021-11-26 RX ORDER — GABAPENTIN 300 MG/1
900 CAPSULE ORAL 3 TIMES DAILY
Status: DISCONTINUED | OUTPATIENT
Start: 2021-11-26 | End: 2021-11-27 | Stop reason: HOSPADM

## 2021-11-26 RX ORDER — PROCHLORPERAZINE 25 MG
25 SUPPOSITORY, RECTAL RECTAL EVERY 12 HOURS PRN
Status: DISCONTINUED | OUTPATIENT
Start: 2021-11-26 | End: 2021-11-27 | Stop reason: HOSPADM

## 2021-11-26 RX ORDER — ONDANSETRON 2 MG/ML
4 INJECTION INTRAMUSCULAR; INTRAVENOUS EVERY 6 HOURS PRN
Status: DISCONTINUED | OUTPATIENT
Start: 2021-11-26 | End: 2021-11-27 | Stop reason: HOSPADM

## 2021-11-26 RX ORDER — OXYCODONE HYDROCHLORIDE 5 MG/1
5 TABLET ORAL EVERY 4 HOURS PRN
Status: DISCONTINUED | OUTPATIENT
Start: 2021-11-26 | End: 2021-11-27 | Stop reason: HOSPADM

## 2021-11-26 RX ORDER — HYDROMORPHONE HCL IN WATER/PF 6 MG/30 ML
.2-.4 PATIENT CONTROLLED ANALGESIA SYRINGE INTRAVENOUS
Status: DISCONTINUED | OUTPATIENT
Start: 2021-11-26 | End: 2021-11-27 | Stop reason: HOSPADM

## 2021-11-26 RX ORDER — FOLIC ACID 1 MG/1
1 TABLET ORAL DAILY
Status: DISCONTINUED | OUTPATIENT
Start: 2021-11-26 | End: 2021-11-27 | Stop reason: HOSPADM

## 2021-11-26 RX ORDER — BISACODYL 10 MG
10 SUPPOSITORY, RECTAL RECTAL DAILY PRN
Status: DISCONTINUED | OUTPATIENT
Start: 2021-11-26 | End: 2021-11-27 | Stop reason: HOSPADM

## 2021-11-26 RX ORDER — LANOLIN ALCOHOL/MO/W.PET/CERES
3 CREAM (GRAM) TOPICAL
Status: DISCONTINUED | OUTPATIENT
Start: 2021-11-26 | End: 2021-11-27 | Stop reason: HOSPADM

## 2021-11-26 RX ORDER — GADOBUTROL 604.72 MG/ML
10 INJECTION INTRAVENOUS ONCE
Status: COMPLETED | OUTPATIENT
Start: 2021-11-26 | End: 2021-11-26

## 2021-11-26 RX ORDER — MULTIPLE VITAMINS W/ MINERALS TAB 9MG-400MCG
1 TAB ORAL DAILY
Status: DISCONTINUED | OUTPATIENT
Start: 2021-11-26 | End: 2021-11-27 | Stop reason: HOSPADM

## 2021-11-26 RX ORDER — POLYETHYLENE GLYCOL 3350 17 G/17G
17 POWDER, FOR SOLUTION ORAL DAILY PRN
Status: DISCONTINUED | OUTPATIENT
Start: 2021-11-26 | End: 2021-11-27 | Stop reason: HOSPADM

## 2021-11-26 RX ORDER — NALOXONE HYDROCHLORIDE 0.4 MG/ML
0.2 INJECTION, SOLUTION INTRAMUSCULAR; INTRAVENOUS; SUBCUTANEOUS
Status: DISCONTINUED | OUTPATIENT
Start: 2021-11-26 | End: 2021-11-27 | Stop reason: HOSPADM

## 2021-11-26 RX ORDER — ONDANSETRON 4 MG/1
4 TABLET, ORALLY DISINTEGRATING ORAL EVERY 6 HOURS PRN
Status: DISCONTINUED | OUTPATIENT
Start: 2021-11-26 | End: 2021-11-27 | Stop reason: HOSPADM

## 2021-11-26 RX ORDER — CALCIUM CARBONATE 500 MG/1
1000 TABLET, CHEWABLE ORAL 4 TIMES DAILY PRN
Status: DISCONTINUED | OUTPATIENT
Start: 2021-11-26 | End: 2021-11-27 | Stop reason: HOSPADM

## 2021-11-26 RX ORDER — LORAZEPAM 2 MG/ML
1 INJECTION INTRAMUSCULAR
Status: COMPLETED | OUTPATIENT
Start: 2021-11-26 | End: 2021-11-26

## 2021-11-26 RX ORDER — PROCHLORPERAZINE MALEATE 10 MG
10 TABLET ORAL EVERY 6 HOURS PRN
Status: DISCONTINUED | OUTPATIENT
Start: 2021-11-26 | End: 2021-11-27 | Stop reason: HOSPADM

## 2021-11-26 RX ORDER — LIDOCAINE 40 MG/G
CREAM TOPICAL
Status: DISCONTINUED | OUTPATIENT
Start: 2021-11-26 | End: 2021-11-27 | Stop reason: HOSPADM

## 2021-11-26 RX ORDER — ACETAMINOPHEN 650 MG/1
650 SUPPOSITORY RECTAL EVERY 6 HOURS PRN
Status: DISCONTINUED | OUTPATIENT
Start: 2021-11-26 | End: 2021-11-27 | Stop reason: HOSPADM

## 2021-11-26 RX ORDER — FLUMAZENIL 0.1 MG/ML
0.2 INJECTION, SOLUTION INTRAVENOUS
Status: DISCONTINUED | OUTPATIENT
Start: 2021-11-26 | End: 2021-11-27 | Stop reason: HOSPADM

## 2021-11-26 RX ADMIN — GABAPENTIN 900 MG: 300 CAPSULE ORAL at 12:32

## 2021-11-26 RX ADMIN — SODIUM CHLORIDE, POTASSIUM CHLORIDE, SODIUM LACTATE AND CALCIUM CHLORIDE: 600; 310; 30; 20 INJECTION, SOLUTION INTRAVENOUS at 01:53

## 2021-11-26 RX ADMIN — TAZOBACTAM SODIUM AND PIPERACILLIN SODIUM 4.5 G: 500; 4 INJECTION, SOLUTION INTRAVENOUS at 00:26

## 2021-11-26 RX ADMIN — HYDROMORPHONE HYDROCHLORIDE 0.4 MG: 0.2 INJECTION, SOLUTION INTRAMUSCULAR; INTRAVENOUS; SUBCUTANEOUS at 02:16

## 2021-11-26 RX ADMIN — FOLIC ACID 1 MG: 1 TABLET ORAL at 12:44

## 2021-11-26 RX ADMIN — GADOBUTROL 10 ML: 604.72 INJECTION INTRAVENOUS at 18:47

## 2021-11-26 RX ADMIN — ACETAMINOPHEN 650 MG: 325 TABLET, FILM COATED ORAL at 17:38

## 2021-11-26 RX ADMIN — GABAPENTIN 900 MG: 300 CAPSULE ORAL at 20:02

## 2021-11-26 RX ADMIN — SODIUM CHLORIDE, POTASSIUM CHLORIDE, SODIUM LACTATE AND CALCIUM CHLORIDE: 600; 310; 30; 20 INJECTION, SOLUTION INTRAVENOUS at 20:02

## 2021-11-26 RX ADMIN — ACETAMINOPHEN 650 MG: 325 TABLET, FILM COATED ORAL at 11:20

## 2021-11-26 RX ADMIN — LORAZEPAM 1 MG: 2 INJECTION INTRAMUSCULAR; INTRAVENOUS at 18:39

## 2021-11-26 RX ADMIN — LISINOPRIL AND HYDROCHLOROTHIAZIDE 1 TABLET: 25; 20 TABLET ORAL at 17:38

## 2021-11-26 RX ADMIN — TAZOBACTAM SODIUM AND PIPERACILLIN SODIUM 3.38 G: 375; 3 INJECTION, SOLUTION INTRAVENOUS at 06:00

## 2021-11-26 RX ADMIN — THIAMINE HCL TAB 100 MG 100 MG: 100 TAB at 12:44

## 2021-11-26 RX ADMIN — HYDROMORPHONE HYDROCHLORIDE 0.4 MG: 0.2 INJECTION, SOLUTION INTRAMUSCULAR; INTRAVENOUS; SUBCUTANEOUS at 17:32

## 2021-11-26 RX ADMIN — HYDROMORPHONE HYDROCHLORIDE 0.4 MG: 0.2 INJECTION, SOLUTION INTRAMUSCULAR; INTRAVENOUS; SUBCUTANEOUS at 08:37

## 2021-11-26 RX ADMIN — METOPROLOL SUCCINATE 150 MG: 50 TABLET, EXTENDED RELEASE ORAL at 12:32

## 2021-11-26 RX ADMIN — HYDROMORPHONE HYDROCHLORIDE 0.4 MG: 0.2 INJECTION, SOLUTION INTRAMUSCULAR; INTRAVENOUS; SUBCUTANEOUS at 12:33

## 2021-11-26 RX ADMIN — HYDROMORPHONE HYDROCHLORIDE 0.4 MG: 0.2 INJECTION, SOLUTION INTRAMUSCULAR; INTRAVENOUS; SUBCUTANEOUS at 20:05

## 2021-11-26 RX ADMIN — TAZOBACTAM SODIUM AND PIPERACILLIN SODIUM 3.38 G: 375; 3 INJECTION, SOLUTION INTRAVENOUS at 12:32

## 2021-11-26 RX ADMIN — TAZOBACTAM SODIUM AND PIPERACILLIN SODIUM 3.38 G: 375; 3 INJECTION, SOLUTION INTRAVENOUS at 17:40

## 2021-11-26 RX ADMIN — MULTIPLE VITAMINS W/ MINERALS TAB 1 TABLET: TAB at 12:44

## 2021-11-26 ASSESSMENT — ACTIVITIES OF DAILY LIVING (ADL)
ADLS_ACUITY_SCORE: 6
ADLS_ACUITY_SCORE: 12
ADLS_ACUITY_SCORE: 6
ADLS_ACUITY_SCORE: 6
ADLS_ACUITY_SCORE: 3
ADLS_ACUITY_SCORE: 6
ADLS_ACUITY_SCORE: 3
ADLS_ACUITY_SCORE: 6
ADLS_ACUITY_SCORE: 3
ADLS_ACUITY_SCORE: 6
ADLS_ACUITY_SCORE: 3
ADLS_ACUITY_SCORE: 5
ADLS_ACUITY_SCORE: 6
ADLS_ACUITY_SCORE: 5
ADLS_ACUITY_SCORE: 12
ADLS_ACUITY_SCORE: 3
ADLS_ACUITY_SCORE: 6
ADLS_ACUITY_SCORE: 3

## 2021-11-26 NOTE — CONSULTS
[facility]  [facility]  Gastroenterology Consultation    Elfego Pickens  [address] [place] [address]  61 year old male    Admission Date/Time: 11/25/2021  Primary Care Provider: [facility], [place]    We were asked to see the patient in consultation by Dr. Estrella for evaluation of choledocholithiasis.    HPI:  Elfego Pickens is a 61 year old male with PMH including atrial fibrillation (not on anticoagulation, hypertension, and hyperlipidemia who was admitted 11/25/21 with abdominal pain.    Patient reports new onset abdominal pain that started yesterday after eating. This is located in the mid abdomen and radiates into the right flank and lower back. It is associated with bloating. No nausea or vomiting. Patient had been having diarrhea for about 1 week but this has improved. Sometimes sees bright red blood when wiping, but no profuse rectal bleeding. No fevers.    Labs on admission significant for WBC 15.9, , AST 1014, , bilirubin 2.5. CT scan showed cholelithiasis, normal CBD, equivocal circumferential thickening in the right colon. US showed cholelithiasis, mild GB thickening, no biliary dilation although CBD obscured. MRCP is pending.    Patient reports his pain is feeling better this morning. His last colonoscopy was at age 49. He drank 2-3 alcoholic beverages yesterday and BAC 0.08 on admission. Typically has 1-2 alcoholic beverages most days.      ROS: A comprehensive ten point review of systems was negative aside from those in mentioned in the HPI.      MEDICATIONS: No current facility-administered medications on file prior to encounter.  amLODIPine (NORVASC) 10 MG tablet, Take 10 mg by mouth daily  Ascorbic Acid (VITAMIN C PO), Take 1 tablet by mouth daily  B Complex Vitamins (B COMPLEX PO), Take 1 tablet by mouth daily  cyclobenzaprine (FLEXERIL) 10 MG tablet, Take 10 mg by mouth 3 times daily as needed for muscle spasms  diphenhydrAMINE (BENADRYL)  You were evaluated today in the Emergency Department for chest pain, palpitations, shortness of breath and early pregnancy.  As we discussed your leaving against medical advice and risks of this include severe disability or death.  Please return to the ED if you wish to continue your workup.     New prescriptions:  None.  May take over the counter acetaminophen (Tylenol) 650 mg by mouth every 4-6 hours as needed for discomfort, not to exceed a total of 3000 mg in any 24 hour period    Please return to the Emergency Department for worsening chest pain, worsening shortness of breath, dizziness or loss of consciousness, or any other new symptoms or concerns.      25 MG capsule, Take 25 mg by mouth daily as needed for itching or allergies  DULoxetine HCl 40 MG CPEP, Take 1 capsule by mouth daily  gabapentin (NEURONTIN) 300 MG capsule, Take 900 mg by mouth 3 times daily  lisinopril-hydrochlorothiazide (ZESTORETIC) 20-25 MG tablet, Take 1 tablet by mouth daily  metoprolol succinate ER (TOPROL-XL) 100 MG 24 hr tablet, Take 150 mg by mouth daily  omega 3 1000 MG CAPS, Take 1 capsule by mouth daily  sildenafil (REVATIO) 20 MG tablet, Take  mg by mouth once as needed (30-60 minutes prior to sexual intercourse)  simvastatin (ZOCOR) 20 MG tablet, Take 20 mg by mouth At Bedtime  VITAMIN D PO, Take 1 tablet by mouth daily  ZINC SULFATE PO, Take 1 tablet by mouth daily      ALLERGIES: No Known Allergies    Past Medical History:   Diagnosis Date     Benign essential hypertension      Hepatitis B infection      Hyperlipidemia LDL goal <100      Osteoarthritis      Paroxysmal atrial fibrillation (H)      Peripheral neuropathy        Past Surgical History:   Procedure Laterality Date     TOTAL KNEE ARTHROPLASTY Right        SOCIAL HISTORY:  Social History     Tobacco Use     Smoking status: Never Smoker     Smokeless tobacco: Never Used   Substance Use Topics     Alcohol use: Yes     Comment: 3-4 drinks daily     Drug use: Never       FAMILY HISTORY:  No family history on file.    PHYSICAL EXAM:   /74 (BP Location: Left arm)   Pulse 98   Temp 98.9  F (37.2  C) (Oral)   Resp 12   Wt 122.6 kg (270 lb 3.2 oz)   SpO2 93%   BMI 35.65 kg/m      Constitutional: No acute distress.  Head: Normocephalic, atraumatic.    Neck: Neck supple. No adenopathy.  Eyes: No scleral icterus.  ENT: Hearing adequate. Pharynx normal without erythema or exudate.  Cardiovascular: RRR, normal S1/S2, no murmurs.  Respiratory: Nonlabored, CTAB.  Abdomen: Soft, bloated. Mild central/right-sided tenderness.  Neuro: CN II-XII grossly intact. No focal deficits.  Extremities: No edema.  Skin: No suspicious  lesions, rashes, or jaundice.  Psychiatric: Mentation appears normal and affect normal.      ADDITIONAL COMMENTS:   I reviewed the patient's new clinical lab test results.   Recent Labs   Lab Test 11/25/21 1903 06/07/21 0611 06/06/21 1907   WBC 15.9* 7.4 6.3   HGB 13.7 13.0* 13.4   MCV 96 95 93    152 214     Recent Labs   Lab Test 11/25/21 1904 06/07/21 0611 06/06/21 1907    142 133   POTASSIUM 3.7 4.0 3.7   CHLORIDE 100 112* 100   CO2 26 22 24   BUN 14 21 24   CR 0.88 1.09 1.67*   ANIONGAP 9 8 9   MEENAKSHI 9.1 7.7* 8.6   * 116* 117*     Recent Labs   Lab Test 11/25/21 1904 06/07/21 0611 06/06/21 1907 06/06/21  0725   ALBUMIN 3.8 3.2* 3.7  --    BILITOTAL 2.5* 0.3 0.3  --    * 31 35  --    AST 1,014* 24 26  --    ALKPHOS 162* 64 69  --    PROTEIN  --   --   --  Negative   LIPASE 365  --   --   --        IMAGING/ENDOSCOPY    CTAP 11/25/21   - Cholelithiasis without adjacent inflammatory change.   - Diverticulosis without diverticulitis. Equivocal circumferential thickening of right colon wall. If not performed recently, GI consult recommended to consider colonoscopy to exclude underlying mass, although this could be artifact from incomplete distention.   - Small fat-containing left inguinal hernia.    RUQ US 11/25/21   - Cholelithiasis with mild gallbladder wall thickening. Negative sonographic Casiano's.   - Hepatic steatosis.      CONSULTATION ASSESSMENT AND PLAN:    Elfego Pickens is a 61-year-old male with PMH as mentioned above who was admitted 11/25/21 with abdominal pain. Imaging showed cholelithiasis. LFTs elevated.    1) Cholelithiasis: Elevated LFTs and bilirubin raise suspicion for choledocholithiasis, although pattern of elevation is atypical for biliary etiology. CBD appeared normal on CT but obscured on ultrasound. MRCP is pending.        - Trend LFTs.        - Await MRCP results. If abnormal, may need ERCP.        - If MRCP normal, may consider workup for other causes of  LFT elevations since the pattern is atypical for biliary obstruction.        - General surgery consulted for possible cholecystectomy.    2) Colonic wall thickening: Equivocal right-sided colon thickening on CT scan. Patient reports some scant rectal bleeding over the past few weeks, most consistent with hemorrhoids. Because last colonoscopy was at age 49, I encouraged patient to schedule colonoscopy as outpatient once acute issues resolved.         - Outpatient colonoscopy. [facility] will arrange.    I discussed the patient plan with Dr. Das. Thank you for asking us to participate in the care of this patient.    Ashlyn Crawford PA-C  [facility] ([facility])     -------------------  I agree with the assessment and plan of Ashlyn GRANT.  Patient reports abdominal pain is better, however is still requiring pain medications for this.  On exam NAD, non-labored breathing, abd is soft,nt,nd, no guarding.    A/P  Admitted with abdominal pain, abnormal LFTs, cholelithiasis, however no biliary duct dilation.  Having MRCP this afternoon to evaluate bile ducts further.  Plan as above.      We will continue to follow.    Lor Das MD

## 2021-11-26 NOTE — UTILIZATION REVIEW
Admission Status; Secondary Review Determination       Under the authority of the Utilization Management Committee, the utilization review process indicated a secondary review on the above patient. The review outcome is based on review of the medical records, discussions with staff, and applying clinical experience noted on the date of the review.     (x) Inpatient Status Appropriate - This patient's medical care is consistent with medical management for inpatient care and reasonable inpatient medical practice.     RATIONALE FOR DETERMINATION   61-year-old man with paroxysmal atrial fibrillation and hypertension presented to the emergency room with abdominal pain further evaluation was concerning for acute cholecystitis with choledocholithiasis patient is on intravenous antibiotic, has significant elevation in liver function tests and leukocytosis would require complex work-up including ERCP as well as anticipated surgery prior to discharge.  The expected length of stay at the time of admission was more than 2 nights because of the severity of illness, intensity of service provided, and risk for adverse outcome. Inpatient admission is appropriate.     This document was produced using voice recognition software       The information on this document is developed by the utilization review team in order for the business office to ensure compliance. This only denotes the appropriateness of proper admission status and does not reflect the quality of care rendered.   The definitions of Inpatient Status and Observation Status used in making the determination above are those provided in the CMS Coverage Manual, Chapter 1 and Chapter 6, section 70.4.   Sincerely,   ANGELICA NORRIS MD   System Medical Director   Utilization Management   [facility].

## 2021-11-26 NOTE — H&P (VIEW-ONLY)
[facility]  History and Physical Hospitalist       Date of Admission:  11/25/2021    Assessment & Plan   Elfego Pickens is a 61 year old male with PMHx of a. Fib, HLD, A. Fib, and HTN who presents to [facility] ED with abdominal pain.     VS in ED: Temperature 97.8, heart rate 110, blood pressure 152/78, pulse ox 93% on room air.  Laboratory work-up: Sodium 135, potassium 3.7, chloride 100, CO2 26 BUN 14, creatinine 0.88, glucose 162, T bili 2.5, alkaline phosphatase 162, , AST 1014.  Lipase 365.  Troponin negative.  WBC 15.9.  Hemoglobin 13.7.  Platelet count 216.  COVID-19/influenza negative. Imaging: RUQ US: cholelithiasis with mild gallbladder wall thickening. Hepatic steatosis. CT abdomen/pelvis: Cholelithiasis with inflammatory change. Diverticulosis without diverticulitis. Equivocal circumferential thickening of right colon wall - recommend colonoscopy to exclude mass. Inguinal hernia. CXR: Unchanged.     ED treated patient with IV zosyn, IVF, and famotidine. Discussed patient with GI - suspect patient will need ERCP. Attempted to transfer patient for ERCP since not available at [facility] on 11/27 - unable to transfer patient. Recommending MRCP with GI consultation in AM to reassess.     Acute cholecystitis with choledocholithiasis; hepatic steatosis: Obstructive LFT pattern. GI consulted overnight. Recommended ERCP but no beds available for ERCP.   -GI and surgery consulted  -NPO for possible procedure - patient may need to transfer for ERCP and return to [facility]  -MRCP in AM  -Abx: Empiric Zosyn given gallbladder wall thickening and     Incidental CT finding: CT abdomen/pelvis with circumferential thickening of right colon wall. Colonoscopy completed at age 49. Patient reports normal. Completed at [facility] 5/2011. Unable to see report. Patient has had intentional weight loss but unable to quantify amount. Encouraged repeat colonoscopy as an outpatient.     Alcohol abuse without  acute alcohol withdrawal: Ethanol level 0.08. Patient reports drinking 3-4 drinks daily. He has gone 3-4 days without alcohol without complications.   -Monitor CIWA. If triggered notify provider. Denies prior hx of alcohol withdrawal symptoms  -Thiamine/Folate/MV  -Check magnesium/phosphorus     Hypertension: PTA amlodipine, lisinopril-hydrochlorothiazide, Metoprolol    Hyperlipidemia: Hold statin given LFT abn.     Peripheral neuropathy: PTA duloxetine/gabapentin    Paroxysmal atrial fibrillation s/p cardioversion (1/2020): Patient follows with Cardiology. CHADSVASC: 1. Rate controlled with Metoprolol 150 mg daily.      Obesity: Complicates cares     FEN:  ml/hr; check mag/phosphorus; NPO  Activity: As tolerated  DVT Prophylaxis: Pneumatic Compression Devices  Code Status: Full Code    Expected discharge: 1-2 days pending MRCP/ERCP and consult evaluation    Sandy Estrella DO    Primary Care Physician   [facility]    Chief Complaint   Abdominal pain  History is obtained from the patient, electronic health record and emergency department physician (Dr. Jae Win)     History of Present Illness   Elfego Pickens is a 61 year old male with PMHx of a. Fib, HLD, A. Fib, and HTN who presents to [facility] ED with abdominal pain.    Patient had been feeling his normal self until today. He had acute onset of abdominal pain. He has had diarrhea for roughly 1 week. He is now having regular stools. COVID-19 testing was negative. No melena. Bright red blood which patient associated with irritation - scant blood. No n/v. He had been eating and drinking normally. He reports a sensation of fullness recently - has been present for last few days. Bloated. Pepto did help. Worsening pain after eating (stuffing/green beans/turkey). Reports hot flashes just today. Unknown if febrile. Occasional chills. Unknown if weight loss. He does feel like he has lost weight and clothes fitting loser. He has slightly been trying  to loose weight. No respiratory symptoms. No chest pain/palpitations. No dysuria or hematuria.     No hx of complications with anesthesia with surgery.     Past Medical History    I have reviewed this patient's medical history and updated it with pertinent information if needed.   Past Medical History:   Diagnosis Date     Benign essential hypertension      Hepatitis B infection      Hyperlipidemia LDL goal <100      Osteoarthritis      Paroxysmal atrial fibrillation (H)      Peripheral neuropathy      Past Surgical History   I have reviewed this patient's surgical history and updated it with pertinent information if needed.  Past Surgical History:   Procedure Laterality Date     TOTAL KNEE ARTHROPLASTY Right      Prior to Admission Medications   Prior to Admission Medications   Prescriptions Last Dose Informant Patient Reported? Taking?   Ascorbic Acid (VITAMIN C PO)   Yes No   Sig: Take 1 tablet by mouth daily   B Complex Vitamins (B COMPLEX PO)   Yes No   Sig: Take 1 tablet by mouth daily   DULoxetine HCl 40 MG CPEP   Yes No   Sig: Take 1 capsule by mouth daily   VITAMIN D PO   Yes No   Sig: Take 1 tablet by mouth daily   ZINC SULFATE PO   Yes No   Sig: Take 1 tablet by mouth daily   amLODIPine (NORVASC) 10 MG tablet   Yes No   Sig: Take 10 mg by mouth daily   cyclobenzaprine (FLEXERIL) 10 MG tablet   Yes No   Sig: Take 10 mg by mouth 3 times daily as needed for muscle spasms   diphenhydrAMINE (BENADRYL) 25 MG capsule   Yes No   Sig: Take 25 mg by mouth daily as needed for itching or allergies   gabapentin (NEURONTIN) 300 MG capsule   Yes No   Sig: Take 900 mg by mouth 3 times daily   lisinopril-hydrochlorothiazide (ZESTORETIC) 20-25 MG tablet   Yes No   Sig: Take 1 tablet by mouth daily   metoprolol succinate ER (TOPROL-XL) 100 MG 24 hr tablet   Yes No   Sig: Take 150 mg by mouth daily   omega 3 1000 MG CAPS   Yes No   Sig: Take 1 capsule by mouth daily   sildenafil (REVATIO) 20 MG tablet   Yes No   Sig: Take   mg by mouth once as needed (30-60 minutes prior to sexual intercourse)   simvastatin (ZOCOR) 20 MG tablet   Yes No   Sig: Take 20 mg by mouth At Bedtime      Facility-Administered Medications: None     Allergies   No Known Allergies    Social History   I have reviewed this patient's social history and updated it with pertinent information if needed. Elfego Pickens  reports that he has never smoked. He has never used smokeless tobacco. He reports current alcohol use. He reports that he does not use drugs.    Family History   Family history reviewed with patient and is noncontributory.    Review of Systems   The 10 point Review of Systems is negative other than noted in the HPI     Physical Exam   Temp: 98.7  F (37.1  C)   BP: 108/60 Pulse: 104   Resp: 18 SpO2: 92 % O2 Device: None (Room air)    Vital Signs with Ranges  Temp:  [97.8  F (36.6  C)] 97.8  F (36.6  C)  Pulse:  [107-110] 110  Resp:  [18] 18  BP: (149-168)/(78-94) 152/78  SpO2:  [93 %-96 %] 93 %  0 lbs 0 oz    Constitutional: Awake, alert, cooperative, no apparent distress.  HEENT: AT. NC. Conjunctiva non-injected. Sclera anicteric. Pupils examined and normal. Dry mucus membranes. Normal dentition. Flush face.   Respiratory: No use of accessory respiratory muscles. Clear to auscultation bilaterally, no crackles or wheezing.  Cardiovascular: Regular rate and rhythm, normal S1 and S2, and no murmur noted.  GI: Obese, round, soft, mild tenderness in RUQ. No rebound tenderness or guarding. Normoactive BS+   Lymph/Hematologic: No anterior cervical or supraclavicular adenopathy.  Skin: No rashes, no cyanosis, no edema. Warm to touch.   Musculoskeletal: No joint swelling, erythema or tenderness.  Neurologic: Cranial nerves 2-12 intact, normal strength and sensation.  Psychiatric: Alert, oriented to person, place and time, no obvious anxiety or depression.    Data   Data reviewed today:  I personally reviewed     Recent Labs   Lab 11/25/21  7157  11/25/21  1903   WBC  --  15.9*   HGB  --  13.7   MCV  --  96   PLT  --  216     --    POTASSIUM 3.7  --    CHLORIDE 100  --    CO2 26  --    BUN 14  --    CR 0.88  --    ANIONGAP 9  --    MEENAKSHI 9.1  --    *  --    ALBUMIN 3.8  --    PROTTOTAL 7.5  --    BILITOTAL 2.5*  --    ALKPHOS 162*  --    *  --    AST 1,014*  --    LIPASE 365  --    TROPONIN <0.015  --      Recent Results (from the past 24 hour(s))   CT Abdomen Pelvis w Contrast    Narrative    EXAM: CT ABDOMEN PELVIS W CONTRAST  LOCATION: [facility]  DATE/TIME: 11/25/2021 8:41 PM    INDICATION: Epigastric abdominal pain with nausea and vomiting  COMPARISON: 6/6/2021  TECHNIQUE: CT scan of the abdomen and pelvis was performed following injection of IV contrast. Multiplanar reformats were obtained. Dose reduction techniques were used.  CONTRAST: 100 mL Isovue-370    FINDINGS:   LOWER CHEST: Mild fibroatelectasis. No focal infiltrate or effusion.    HEPATOBILIARY: Gallstones in the gallbladder neck with no adjacent inflammatory change. Liver normal.    PANCREAS: Normal.    SPLEEN: Normal.    ADRENAL GLANDS: Normal.    KIDNEYS/BLADDER: Small left renal parapelvic and cortical cysts requiring no further evaluation, otherwise normal.    BOWEL: Diverticulosis without diverticulitis. Equivocal circumferential colonic wall thickening involving ascending colon could be artifact from incomplete distention. No bowel obstruction. No acute inflammatory process involves the GI tract.    LYMPH NODES: Normal.    VASCULATURE: Unremarkable.    PELVIC ORGANS: Normal.    MUSCULOSKELETAL: Small fat-containing left inguinal hernia. Degenerative disease.      Impression    IMPRESSION:   1.  Cholelithiasis without adjacent inflammatory change.  2.  Diverticulosis without diverticulitis. Equivocal circumferential thickening of right colon wall. If not performed recently, GI consult recommended to consider colonoscopy to exclude underlying  mass, although this could be artifact from incomplete   distention.  3.  Small fat-containing left inguinal hernia.    NOTE: ABNORMAL REPORT    THE DICTATION ABOVE DESCRIBES AN ABNORMALITY FOR WHICH FOLLOW-UP IS NEEDED.    XR Chest 2 Views    Narrative    EXAM: XR CHEST 2 VW  LOCATION: [facility]  DATE/TIME: 11/25/2021 8:50 PM    INDICATION: cough, recent pna  COMPARISON: 06/06/2021      Impression    IMPRESSION: No change. Few tiny calcified granuloma present left mid lung. Lungs otherwise clear. Heart size and pulmonary vessels are normal.   US Abdomen Limited (RUQ)    Narrative    EXAM: US ABDOMEN LIMITED  LOCATION: [facility]  DATE/TIME: 11/25/2021 10:29 PM    INDICATION: Abdominal pain with gallstones noted on CT  COMPARISON: CT earlier today  TECHNIQUE: Limited abdominal ultrasound.    FINDINGS:    GALLBLADDER: Cholelithiasis. Wall mildly thickened to 4 mm. Negative sonographic Casiano's.    BILE DUCTS: No biliary dilatation. The common duct is obscured from bowel gas however it was normal on CT.    LIVER: Hepatic steatosis. No focal mass.    RIGHT KIDNEY: No hydronephrosis.    PANCREAS: Completely obscured from bowel gas.    No ascites.      Impression    IMPRESSION:  1.  Cholelithiasis with mild gallbladder wall thickening. Negative sonographic Casiano's.  2.  Hepatic steatosis.

## 2021-11-26 NOTE — H&P
[facility]  History and Physical Hospitalist       Date of Admission:  11/25/2021    Assessment & Plan   Elfego Pickens is a 61 year old male with PMHx of a. Fib, HLD, A. Fib, and HTN who presents to [facility] ED with abdominal pain.     VS in ED: Temperature 97.8, heart rate 110, blood pressure 152/78, pulse ox 93% on room air.  Laboratory work-up: Sodium 135, potassium 3.7, chloride 100, CO2 26 BUN 14, creatinine 0.88, glucose 162, T bili 2.5, alkaline phosphatase 162, , AST 1014.  Lipase 365.  Troponin negative.  WBC 15.9.  Hemoglobin 13.7.  Platelet count 216.  COVID-19/influenza negative. Imaging: RUQ US: cholelithiasis with mild gallbladder wall thickening. Hepatic steatosis. CT abdomen/pelvis: Cholelithiasis with inflammatory change. Diverticulosis without diverticulitis. Equivocal circumferential thickening of right colon wall - recommend colonoscopy to exclude mass. Inguinal hernia. CXR: Unchanged.     ED treated patient with IV zosyn, IVF, and famotidine. Discussed patient with GI - suspect patient will need ERCP. Attempted to transfer patient for ERCP since not available at [facility] on 11/27 - unable to transfer patient. Recommending MRCP with GI consultation in AM to reassess.     Acute cholecystitis with choledocholithiasis; hepatic steatosis: Obstructive LFT pattern. GI consulted overnight. Recommended ERCP but no beds available for ERCP.   -GI and surgery consulted  -NPO for possible procedure - patient may need to transfer for ERCP and return to [facility]  -MRCP in AM  -Abx: Empiric Zosyn given gallbladder wall thickening and     Incidental CT finding: CT abdomen/pelvis with circumferential thickening of right colon wall. Colonoscopy completed at age 49. Patient reports normal. Completed at [facility] 5/2011. Unable to see report. Patient has had intentional weight loss but unable to quantify amount. Encouraged repeat colonoscopy as an outpatient.     Alcohol abuse without  acute alcohol withdrawal: Ethanol level 0.08. Patient reports drinking 3-4 drinks daily. He has gone 3-4 days without alcohol without complications.   -Monitor CIWA. If triggered notify provider. Denies prior hx of alcohol withdrawal symptoms  -Thiamine/Folate/MV  -Check magnesium/phosphorus     Hypertension: PTA amlodipine, lisinopril-hydrochlorothiazide, Metoprolol    Hyperlipidemia: Hold statin given LFT abn.     Peripheral neuropathy: PTA duloxetine/gabapentin    Paroxysmal atrial fibrillation s/p cardioversion (1/2020): Patient follows with Cardiology. CHADSVASC: 1. Rate controlled with Metoprolol 150 mg daily.      Obesity: Complicates cares     FEN:  ml/hr; check mag/phosphorus; NPO  Activity: As tolerated  DVT Prophylaxis: Pneumatic Compression Devices  Code Status: Full Code    Expected discharge: 1-2 days pending MRCP/ERCP and consult evaluation    Sandy Estrella DO    Primary Care Physician   [facility]    Chief Complaint   Abdominal pain  History is obtained from the patient, electronic health record and emergency department physician (Dr. Jae Win)     History of Present Illness   Elfego Pickens is a 61 year old male with PMHx of a. Fib, HLD, A. Fib, and HTN who presents to [facility] ED with abdominal pain.    Patient had been feeling his normal self until today. He had acute onset of abdominal pain. He has had diarrhea for roughly 1 week. He is now having regular stools. COVID-19 testing was negative. No melena. Bright red blood which patient associated with irritation - scant blood. No n/v. He had been eating and drinking normally. He reports a sensation of fullness recently - has been present for last few days. Bloated. Pepto did help. Worsening pain after eating (stuffing/green beans/turkey). Reports hot flashes just today. Unknown if febrile. Occasional chills. Unknown if weight loss. He does feel like he has lost weight and clothes fitting loser. He has slightly been trying  to loose weight. No respiratory symptoms. No chest pain/palpitations. No dysuria or hematuria.     No hx of complications with anesthesia with surgery.     Past Medical History    I have reviewed this patient's medical history and updated it with pertinent information if needed.   Past Medical History:   Diagnosis Date     Benign essential hypertension      Hepatitis B infection      Hyperlipidemia LDL goal <100      Osteoarthritis      Paroxysmal atrial fibrillation (H)      Peripheral neuropathy      Past Surgical History   I have reviewed this patient's surgical history and updated it with pertinent information if needed.  Past Surgical History:   Procedure Laterality Date     TOTAL KNEE ARTHROPLASTY Right      Prior to Admission Medications   Prior to Admission Medications   Prescriptions Last Dose Informant Patient Reported? Taking?   Ascorbic Acid (VITAMIN C PO)   Yes No   Sig: Take 1 tablet by mouth daily   B Complex Vitamins (B COMPLEX PO)   Yes No   Sig: Take 1 tablet by mouth daily   DULoxetine HCl 40 MG CPEP   Yes No   Sig: Take 1 capsule by mouth daily   VITAMIN D PO   Yes No   Sig: Take 1 tablet by mouth daily   ZINC SULFATE PO   Yes No   Sig: Take 1 tablet by mouth daily   amLODIPine (NORVASC) 10 MG tablet   Yes No   Sig: Take 10 mg by mouth daily   cyclobenzaprine (FLEXERIL) 10 MG tablet   Yes No   Sig: Take 10 mg by mouth 3 times daily as needed for muscle spasms   diphenhydrAMINE (BENADRYL) 25 MG capsule   Yes No   Sig: Take 25 mg by mouth daily as needed for itching or allergies   gabapentin (NEURONTIN) 300 MG capsule   Yes No   Sig: Take 900 mg by mouth 3 times daily   lisinopril-hydrochlorothiazide (ZESTORETIC) 20-25 MG tablet   Yes No   Sig: Take 1 tablet by mouth daily   metoprolol succinate ER (TOPROL-XL) 100 MG 24 hr tablet   Yes No   Sig: Take 150 mg by mouth daily   omega 3 1000 MG CAPS   Yes No   Sig: Take 1 capsule by mouth daily   sildenafil (REVATIO) 20 MG tablet   Yes No   Sig: Take   mg by mouth once as needed (30-60 minutes prior to sexual intercourse)   simvastatin (ZOCOR) 20 MG tablet   Yes No   Sig: Take 20 mg by mouth At Bedtime      Facility-Administered Medications: None     Allergies   No Known Allergies    Social History   I have reviewed this patient's social history and updated it with pertinent information if needed. Elfego Pickens  reports that he has never smoked. He has never used smokeless tobacco. He reports current alcohol use. He reports that he does not use drugs.    Family History   Family history reviewed with patient and is noncontributory.    Review of Systems   The 10 point Review of Systems is negative other than noted in the HPI     Physical Exam   Temp: 98.7  F (37.1  C)   BP: 108/60 Pulse: 104   Resp: 18 SpO2: 92 % O2 Device: None (Room air)    Vital Signs with Ranges  Temp:  [97.8  F (36.6  C)] 97.8  F (36.6  C)  Pulse:  [107-110] 110  Resp:  [18] 18  BP: (149-168)/(78-94) 152/78  SpO2:  [93 %-96 %] 93 %  0 lbs 0 oz    Constitutional: Awake, alert, cooperative, no apparent distress.  HEENT: AT. NC. Conjunctiva non-injected. Sclera anicteric. Pupils examined and normal. Dry mucus membranes. Normal dentition. Flush face.   Respiratory: No use of accessory respiratory muscles. Clear to auscultation bilaterally, no crackles or wheezing.  Cardiovascular: Regular rate and rhythm, normal S1 and S2, and no murmur noted.  GI: Obese, round, soft, mild tenderness in RUQ. No rebound tenderness or guarding. Normoactive BS+   Lymph/Hematologic: No anterior cervical or supraclavicular adenopathy.  Skin: No rashes, no cyanosis, no edema. Warm to touch.   Musculoskeletal: No joint swelling, erythema or tenderness.  Neurologic: Cranial nerves 2-12 intact, normal strength and sensation.  Psychiatric: Alert, oriented to person, place and time, no obvious anxiety or depression.    Data   Data reviewed today:  I personally reviewed     Recent Labs   Lab 11/25/21  3311  11/25/21  1903   WBC  --  15.9*   HGB  --  13.7   MCV  --  96   PLT  --  216     --    POTASSIUM 3.7  --    CHLORIDE 100  --    CO2 26  --    BUN 14  --    CR 0.88  --    ANIONGAP 9  --    MEENAKSHI 9.1  --    *  --    ALBUMIN 3.8  --    PROTTOTAL 7.5  --    BILITOTAL 2.5*  --    ALKPHOS 162*  --    *  --    AST 1,014*  --    LIPASE 365  --    TROPONIN <0.015  --      Recent Results (from the past 24 hour(s))   CT Abdomen Pelvis w Contrast    Narrative    EXAM: CT ABDOMEN PELVIS W CONTRAST  LOCATION: [facility]  DATE/TIME: 11/25/2021 8:41 PM    INDICATION: Epigastric abdominal pain with nausea and vomiting  COMPARISON: 6/6/2021  TECHNIQUE: CT scan of the abdomen and pelvis was performed following injection of IV contrast. Multiplanar reformats were obtained. Dose reduction techniques were used.  CONTRAST: 100 mL Isovue-370    FINDINGS:   LOWER CHEST: Mild fibroatelectasis. No focal infiltrate or effusion.    HEPATOBILIARY: Gallstones in the gallbladder neck with no adjacent inflammatory change. Liver normal.    PANCREAS: Normal.    SPLEEN: Normal.    ADRENAL GLANDS: Normal.    KIDNEYS/BLADDER: Small left renal parapelvic and cortical cysts requiring no further evaluation, otherwise normal.    BOWEL: Diverticulosis without diverticulitis. Equivocal circumferential colonic wall thickening involving ascending colon could be artifact from incomplete distention. No bowel obstruction. No acute inflammatory process involves the GI tract.    LYMPH NODES: Normal.    VASCULATURE: Unremarkable.    PELVIC ORGANS: Normal.    MUSCULOSKELETAL: Small fat-containing left inguinal hernia. Degenerative disease.      Impression    IMPRESSION:   1.  Cholelithiasis without adjacent inflammatory change.  2.  Diverticulosis without diverticulitis. Equivocal circumferential thickening of right colon wall. If not performed recently, GI consult recommended to consider colonoscopy to exclude underlying  mass, although this could be artifact from incomplete   distention.  3.  Small fat-containing left inguinal hernia.    NOTE: ABNORMAL REPORT    THE DICTATION ABOVE DESCRIBES AN ABNORMALITY FOR WHICH FOLLOW-UP IS NEEDED.    XR Chest 2 Views    Narrative    EXAM: XR CHEST 2 VW  LOCATION: [facility]  DATE/TIME: 11/25/2021 8:50 PM    INDICATION: cough, recent pna  COMPARISON: 06/06/2021      Impression    IMPRESSION: No change. Few tiny calcified granuloma present left mid lung. Lungs otherwise clear. Heart size and pulmonary vessels are normal.   US Abdomen Limited (RUQ)    Narrative    EXAM: US ABDOMEN LIMITED  LOCATION: [facility]  DATE/TIME: 11/25/2021 10:29 PM    INDICATION: Abdominal pain with gallstones noted on CT  COMPARISON: CT earlier today  TECHNIQUE: Limited abdominal ultrasound.    FINDINGS:    GALLBLADDER: Cholelithiasis. Wall mildly thickened to 4 mm. Negative sonographic Casiano's.    BILE DUCTS: No biliary dilatation. The common duct is obscured from bowel gas however it was normal on CT.    LIVER: Hepatic steatosis. No focal mass.    RIGHT KIDNEY: No hydronephrosis.    PANCREAS: Completely obscured from bowel gas.    No ascites.      Impression    IMPRESSION:  1.  Cholelithiasis with mild gallbladder wall thickening. Negative sonographic Casiano's.  2.  Hepatic steatosis.

## 2021-11-26 NOTE — ED NOTES
[facility]  ED Nurse Handoff Report    Elfego Pickens is a 61 year old male   ED Chief complaint: Abdominal Pain  . ED Diagnosis:   Final diagnoses:   None     Allergies: No Known Allergies    Code Status: Full Code  Activity level - Baseline/Home:  Independent. Activity Level - Current:   Independent. Lift room needed: No. Bariatric: No   Needed: No   Isolation: No. Infection: Not Applicable.     Vital Signs:   Vitals:    11/25/21 1907 11/25/21 1915 11/25/21 1920 11/25/21 1945   BP: (!) 149/83 (!) 150/94 (!) 150/94 (!) 152/78   Pulse: 108  110    Resp:       Temp:       SpO2: 93% 94% 94% 93%       Cardiac Rhythm:  ,      Pain level:    Patient confused: No. Patient Falls Risk: No.   Elimination Status: Has voided   Patient Report - Initial Complaint: Abdominal pain. Focused Assessment: Upper ABD pain  Labs Ordered and Resulted from Time of ED Arrival to Time of ED Departure   COMPREHENSIVE METABOLIC PANEL - Abnormal       Result Value    Sodium 135      Potassium 3.7      Chloride 100      Carbon Dioxide (CO2) 26      Anion Gap 9      Urea Nitrogen 14      Creatinine 0.88      Calcium 9.1      Glucose 162 (*)     Alkaline Phosphatase 162 (*)     AST 1,014 (*)      (*)     Protein Total 7.5      Albumin 3.8      Bilirubin Total 2.5 (*)     GFR Estimate >90     CBC WITH PLATELETS AND DIFFERENTIAL - Abnormal    WBC Count 15.9 (*)     RBC Count 4.20 (*)     Hemoglobin 13.7      Hematocrit 40.5      MCV 96      MCH 32.6      MCHC 33.8      RDW 11.5      Platelet Count 216      % Neutrophils 94      % Lymphocytes 4      % Monocytes 2      % Eosinophils 0      % Basophils 0      % Immature Granulocytes 0      NRBCs per 100 WBC 0      Absolute Neutrophils 14.9 (*)     Absolute Lymphocytes 0.6 (*)     Absolute Monocytes 0.4      Absolute Eosinophils 0.0      Absolute Basophils 0.0      Absolute Immature Granulocytes 0.1 (*)     Absolute NRBCs 0.0     INFLUENZA A/B & SARS-COV2 PCR MULTIPLEX -  Normal    Influenza A PCR Negative      Influenza B PCR Negative      SARS CoV2 PCR Negative     LIPASE - Normal    Lipase 365     TROPONIN I - Normal    Troponin I High Sensitivity 5     TROPONIN I - Normal    Troponin I <0.015       US Abdomen Limited (RUQ)   Final Result   IMPRESSION:   1.  Cholelithiasis with mild gallbladder wall thickening. Negative sonographic Casiano's.   2.  Hepatic steatosis.            XR Chest 2 Views   Final Result   IMPRESSION: No change. Few tiny calcified granuloma present left mid lung. Lungs otherwise clear. Heart size and pulmonary vessels are normal.      CT Abdomen Pelvis w Contrast   Final Result   IMPRESSION:    1.  Cholelithiasis without adjacent inflammatory change.   2.  Diverticulosis without diverticulitis. Equivocal circumferential thickening of right colon wall. If not performed recently, GI consult recommended to consider colonoscopy to exclude underlying mass, although this could be artifact from incomplete    distention.   3.  Small fat-containing left inguinal hernia.      NOTE: ABNORMAL REPORT      THE DICTATION ABOVE DESCRIBES AN ABNORMALITY FOR WHICH FOLLOW-UP IS NEEDED.           Treatments provided: zofran, pepcid, fluids  Family Comments: Wife @ bedside  OBS brochure/video discussed/provided to patient:  No  ED Medications:   Medications   ondansetron (ZOFRAN) injection 4 mg (4 mg Intravenous Given 11/25/21 1927)   0.9% sodium chloride BOLUS (0 mLs Intravenous Stopped 11/25/21 2151)   famotidine (PEPCID) injection 20 mg (20 mg Intravenous Given 11/25/21 1927)   CT Flush 100mL Saline (65 mLs Intravenous Given 11/25/21 2042)   iopamidol (ISOVUE-370) solution 500 mL (100 mLs Intravenous Given 11/25/21 2042)     Drips infusing:  No  For the majority of the shift, the patient's behavior Green. Interventions performed were NA.    Sepsis treatment initiated: No     Patient tested for COVID 19 prior to admission: YES, negative    ED Nurse Name/Phone Number: Na FULLERLakeshia  Oh RN,   11:16 PM  RECEIVING UNIT ED HANDOFF REVIEW    Above ED Nurse Handoff Report was reviewed: Yes  Reviewed by: Laurie Delcid RN on November 26, 2021 at 1:19 AM

## 2021-11-26 NOTE — PHARMACY-ADMISSION MEDICATION HISTORY
Admission medication history interview status for this patient is complete. See [facility] admission navigator for allergy information, prior to admission medications and immunization status.     Medication history interview done, indicate source(s): Patient  Medication history resources (including written lists, pill bottles, clinic record):None      Changes made to PTA medication list:  Added: none  Changed: none  Reported as Not Taking: none  Removed: none    Actions taken by pharmacist (provider contacted, etc):None     Additional medication history information:None    Medication reconciliation/reorder completed by provider prior to medication history?  Y   (Y/N)     Prior to Admission medications    Medication Sig Last Dose Taking? Auth Provider   amLODIPine (NORVASC) 10 MG tablet Take 10 mg by mouth daily 11/25/2021 at Unknown time Yes Unknown, Entered By History   Ascorbic Acid (VITAMIN C PO) Take 1 tablet by mouth daily 11/25/2021 at Unknown time Yes Unknown, Entered By History   B Complex Vitamins (B COMPLEX PO) Take 1 tablet by mouth daily 11/25/2021 at Unknown time Yes Unknown, Entered By History   cyclobenzaprine (FLEXERIL) 10 MG tablet Take 10 mg by mouth 3 times daily as needed for muscle spasms Past Week at Unknown time Yes Unknown, Entered By History   diphenhydrAMINE (BENADRYL) 25 MG capsule Take 25 mg by mouth daily as needed for itching or allergies Past Week at Unknown time Yes Unknown, Entered By History   DULoxetine HCl 40 MG CPEP Take 1 capsule by mouth daily 11/25/2021 at Unknown time Yes Unknown, Entered By History   gabapentin (NEURONTIN) 300 MG capsule Take 900 mg by mouth 3 times daily 11/25/2021 at Unknown time Yes Unknown, Entered By History   lisinopril-hydrochlorothiazide (ZESTORETIC) 20-25 MG tablet Take 1 tablet by mouth daily 11/25/2021 at Unknown time Yes Unknown, Entered By History   metoprolol succinate ER (TOPROL-XL) 100 MG 24 hr tablet Take 150 mg by mouth daily 11/25/2021 at  Unknown time Yes Unknown, Entered By History   omega 3 1000 MG CAPS Take 1 capsule by mouth daily 11/25/2021 at Unknown time Yes Unknown, Entered By History   simvastatin (ZOCOR) 20 MG tablet Take 20 mg by mouth At Bedtime 11/25/2021 at Unknown time Yes Unknown, Entered By History   VITAMIN D PO Take 1 tablet by mouth daily 11/25/2021 at Unknown time Yes Unknown, Entered By History   ZINC SULFATE PO Take 1 tablet by mouth daily 11/25/2021 at Unknown time Yes Unknown, Entered By History   sildenafil (REVATIO) 20 MG tablet Take  mg by mouth once as needed (30-60 minutes prior to sexual intercourse)   Unknown, Entered By History

## 2021-11-26 NOTE — ED TRIAGE NOTES
Mid abdominal Stomach cramps 1100. Took pepto bismol, helped for a bit. Then came back. Started Coughing a lot. Minor headache.  Bloated

## 2021-11-26 NOTE — PLAN OF CARE
A&Ox4. CMS intact. O2 sats stable on RA while awake. Patient required 2L of O2 at times via OxyMask overnight. Denies SOB. Up w/ SBA in room. BS active x4, remains NPO, passing flatus. Denies nausea. C/o abdominal pain, lower back pain and mild headache. Given PRN po Tylenol and IV Dilaudid w/ relief. Continues IV Zosyn Q6H. CIWA scores: 4, 3, 6, 2. Plan for MRCP this evening, MRI safety checklist has been faxed. One time IV Ativan dose available for use prior to MRCP. GI and General Surgery consulted, see notes. IVF infusing. Blood cultures pending. Will continue to provide supportive cares.

## 2021-11-26 NOTE — CONSULTS
"SURGICAL CONSULTATION   REQUESTING PROVIDER:  Dr Estrella   HISTORY OF PRESENT ILLNESS:  I was asked by Dr. Estrella to see Elfego Pickens who is a 61 year old male with a sudden onset of several hours of  abdominal pain which is located in the epigastric and RUQ.    The pain does radiate to his back.  It does wake him from sleep.  It is associated with  bloating and chills. He does not have a history of fatty food intolerance.   He has a history of jaundice from Hepatitis in 8th grade.  No prior episodes of pancreatitis. Currently, his family reports his color is \"off\". Prior abdominal surgery includes none.   PAST MEDICAL HISTORY:  has a past medical history of Benign essential hypertension, Hepatitis B infection, Hyperlipidemia LDL goal <100, Osteoarthritis, Paroxysmal atrial fibrillation (H), and Peripheral neuropathy.  Smoking History:  has never smoked.    PAST SURGICAL HISTORY:    Past Surgical History:   Procedure Laterality Date     TOTAL KNEE ARTHROPLASTY Right      MEDICATIONS: No current facility-administered medications on file prior to encounter.  amLODIPine (NORVASC) 10 MG tablet, Take 10 mg by mouth daily  Ascorbic Acid (VITAMIN C PO), Take 1 tablet by mouth daily  B Complex Vitamins (B COMPLEX PO), Take 1 tablet by mouth daily  cyclobenzaprine (FLEXERIL) 10 MG tablet, Take 10 mg by mouth 3 times daily as needed for muscle spasms  diphenhydrAMINE (BENADRYL) 25 MG capsule, Take 25 mg by mouth daily as needed for itching or allergies  DULoxetine HCl 40 MG CPEP, Take 1 capsule by mouth daily  gabapentin (NEURONTIN) 300 MG capsule, Take 900 mg by mouth 3 times daily  lisinopril-hydrochlorothiazide (ZESTORETIC) 20-25 MG tablet, Take 1 tablet by mouth daily  metoprolol succinate ER (TOPROL-XL) 100 MG 24 hr tablet, Take 150 mg by mouth daily  omega 3 1000 MG CAPS, Take 1 capsule by mouth daily  sildenafil (REVATIO) 20 MG tablet, Take  mg by mouth once as needed (30-60 minutes prior to sexual " intercourse)  simvastatin (ZOCOR) 20 MG tablet, Take 20 mg by mouth At Bedtime  VITAMIN D PO, Take 1 tablet by mouth daily  ZINC SULFATE PO, Take 1 tablet by mouth daily                                        amLODIPine  10 mg Oral Daily     DULoxetine HCl  1 capsule Oral Daily     folic acid  1 mg Oral Daily     gabapentin  900 mg Oral TID     lisinopril-hydrochlorothiazide  1 tablet Oral Daily     metoprolol succinate ER  150 mg Oral Daily     multivitamin w/minerals  1 tablet Oral Daily     piperacillin-tazobactam  3.375 g Intravenous Q6H     sodium chloride (PF)  3 mL Intracatheter Q8H     thiamine  100 mg Oral Daily     FAMILY HISTORY:    Reviewed and non-contributory  ALLERGIES: This patient is allergic to has No Known Allergies.   REVIEW OF SYSTEMS: Negative except the HPI.   PHYSICAL EXAMINATION:   GENERAL: Pleasant, well-developed, well-nourished male, not ill-appearing but does appear to be faintly jaundiced.   /53 (BP Location: Left arm)   Pulse 102   Temp 99  F (37.2  C) (Oral)   Resp 16   Wt 122.6 kg (270 lb 3.2 oz)   SpO2 94%   BMI 35.65 kg/m    HEENT: Normocephalic, atraumatic. No scleral icterus.   NECK/LYMPH: Supple.  No adenopathy.   LUNGS: Respirations unlabored.   CARDIOVASCULAR: Regular rhythm and rate, no murmurs.  ABDOMEN:  Abdomen is flat. Tenderness, mild and  epigastric.  The gallbladder is non-palpable and mildly tender. hypoactive bowel sounds.  No hernia or scars noted.   EXTREMITIES: Without edema or deformity.  NEUROLOGIC: Alert and oriented, moves all extremities with good strength. Speech clear.   LABORATORY DATA: WBC-   WBC   Date Value Ref Range Status   06/07/2021 7.4 4.0 - 11.0 10e9/L Final     WBC Count   Date Value Ref Range Status   11/25/2021 15.9 (H) 4.0 - 11.0 10e3/uL Final   , HgB-   Hemoglobin   Date Value Ref Range Status   11/25/2021 13.7 13.3 - 17.7 g/dL Final   06/07/2021 13.0 (L) 13.3 - 17.7 g/dL Final     Liver function studies: Total Bilirubin 2.5,  Alkaline Phosphatase 162, , AST 1014, Lipase 365.    IMAGING:  All imaging personally reviewed  CT scan of the abdomen:   IMPRESSION:  1.  Cholelithiasis with mild gallbladder wall thickening. Negative sonographic Casiano's.  2.  Hepatic steatosis.  CT scan interpreted by radiologist     Gallbladder ultrasound:   IMPRESSION:  1. Cholelithiasis with mild gallbladder wall thickening. Negative sonographic Casiano's.  2. Hepatic steatosis.           ASSESSMENT AND PLAN: Elfego Pickens  has symptomatic cholelithiasis and markedly abnormal LFTs, as well as mild jaundice.  I am recommending him for laparoscopic cholecystectomy after the duct is cleared by ERCP.  He is scheduled for MRCP, but it seems clear that he will need ERCP.  I would recommend surgery for his lap anisa to be done several weeks after the successful ERCP, to allow the LFTs to recover.  I will message my  about getting him scheduled as an outpatient, assuming he will need ERCP first.   I discussed the risks of the procedure including incisional related complications like hernia and infection as well as the small chance for post-cholecystectomy diarrhea, or the need to convert to an open cholecystectomy.  We also discussed rare complications such as bile leak, bowel or bile duct injury.  Elfego is interested in proceeding with surgery in the next few weeks.  HU GUERRERO MD     Please route or send letter to:  Primary Care Provider (PCP) and Referring Provider    40 minutes total time spent on the date of this encounter doing:  Chart review, review of test results, patient visit, physical exam, education, counseling, developing plan of care, and documenting.

## 2021-11-26 NOTE — PROGRESS NOTES
[facility]    Hospitalist Progress Note  Name: Elfego Pickens    MRN: [id number]  Provider:  Sri Valiente PA-C  Date of Service: 11/26/2021    Assessment & Plan   Summary of Stay: Elfego Pickens is a 61 year old male with PMHx of paroxysmal A-fib, HLD, and HTN who presents to [facility] ED with abdominal pain.     #Acute cholecystitis with choledocholithiasis; hepatic steatosis: abnormal LFTs, concerning for obstructive etiology. GI consulted overnight. Recommended ERCP but no beds available for ERCP.   -GI consulted, felt LFTs atypical for biliary etiology, recommending MRCP, if abnormal then patient will need ERCP  - general surgery consulted, awaiting results of MRCP, assuming patient needs ERCP it would be recommended the patient have laparoscopic cholecystectomy done several weeks after   -NPO for possible procedure - patient may need to transfer for ERCP and return to [facility]  -Abx: Empiric Zosyn given gallbladder wall thickening and   -follow LFTs     #Incidental CT finding of colonic wall thickening: CT abdomen/pelvis with circumferential thickening of right colon wall. Colonoscopy completed at age 49. Patient reports normal. Completed at [facility] 5/2011. Unable to see report. Patient has had intentional weight loss but unable to quantify amount.   - recommend outpatient colonoscopy, [facility] will arrange      #Alcohol abuse without acute alcohol withdrawal: Ethanol level 0.08 on admission. Patient reports drinking 3-4 drinks daily. He has gone 3-4 days without alcohol without complications.   -Monitor CIWA. If triggered notify provider. No current signs of withdrawal.   -Thiamine/Folate/MV  -magnesium and phosphorus WNL     #Hypertension: PTA amlodipine, lisinopril-hydrochlorothiazide, Metoprolol     #Hyperlipidemia: Hold statin given LFT abn.      #Peripheral neuropathy: PTA duloxetine/gabapentin     #Paroxysmal atrial fibrillation s/p cardioversion (1/2020): Patient follows with Cardiology.  CHADSVASC: 1. Rate controlled with Metoprolol 150 mg daily.       #Obesity: Complicates cares     COVID: tested negative on 11/25/21  DVT Prophylaxis: Pneumatic Compression Devices  Code Status: Full Code  Disposition: Expected discharge in 1-2 days pending MRCP results and recommendations from GI/general surgery based on these results     Sri GRANTADORE  [facility]    Interval History   Assumed care of patient today. Chart reviewed. Patient reports ongoing central/epigastric abdominal pain that improves with pain medication and abdominal bloating. Denies nausea or vomiting, chest pain, or shortness of breath. He has felt hot/cold at home without documented fever. Denies feeling anxious or tremulous.     -Data reviewed today: I reviewed all new labs and imaging reports over the last 24 hours.    Physical Exam   Temp: 98.1  F (36.7  C) Temp src: Oral BP: (!) 141/75 Pulse: 95   Resp: 18 SpO2: 93 % O2 Device: None (Room air) Oxygen Delivery: 3 LPM (O2 sat was 89, shortly after giving IV dilaudid.. )  Vitals:    11/26/21 0139   Weight: 122.6 kg (270 lb 3.2 oz)     Vital Signs with Ranges  Temp:  [97.8  F (36.6  C)-99  F (37.2  C)] 98.1  F (36.7  C)  Pulse:  [] 95  Resp:  [12-18] 18  BP: (108-168)/(53-94) 141/75  SpO2:  [92 %-96 %] 93 %  No intake/output data recorded.    GEN:  Alert, oriented x 3, appears comfortable, NAD.  HEENT:  Normocephalic/atraumatic, mild scleral icterus, no nasal discharge, mouth moist.  CV:  Regular rate and rhythm, no murmur or JVD.  S1 + S2 noted, no S3 or S4.  LUNGS:  Clear to auscultation bilaterally without rales/rhonchi/wheezing/retractions.  Symmetric chest rise on inhalation noted.  ABD:  Active bowel sounds, soft, non-tender/non-distended.  No rebound/guarding/rigidity.  EXT:  No edema.  No cyanosis.  No acute joint synovitis noted.  SKIN:  Dry to touch, no exanthems noted in the visualized areas. Mildly jaundice.     Medications     lactated  ringers 150 mL/hr at 11/26/21 0957       amLODIPine  10 mg Oral Daily     [START ON 11/27/2021] DULoxetine  40 mg Oral Daily     folic acid  1 mg Oral Daily     gabapentin  900 mg Oral TID     lisinopril-hydrochlorothiazide  1 tablet Oral Daily     metoprolol succinate ER  150 mg Oral Daily     multivitamin w/minerals  1 tablet Oral Daily     piperacillin-tazobactam  3.375 g Intravenous Q6H     sodium chloride (PF)  3 mL Intracatheter Q8H     thiamine  100 mg Oral Daily     Data   Results for orders placed or performed during the hospital encounter of 11/25/21 (from the past 24 hour(s))   [place] Draw *Canceled*    Narrative    The following orders were created for panel order [place] Draw.  Procedure                               Abnormality         Status                     ---------                               -----------         ------                       Please view results for these tests on the individual orders.   [place] Draw    Narrative    The following orders were created for panel order [place] Draw.  Procedure                               Abnormality         Status                     ---------                               -----------         ------                     Extra Green Top (Lithium...[[id number]]                      Final result               Extra Purple Top Tube[[id number]]                            Final result                 Please view results for these tests on the individual orders.   Extra Purple Top Tube   Result Value Ref Range    Hold Specimen JI    CBC with platelets differential    Narrative    The following orders were created for panel order CBC with platelets differential.  Procedure                               Abnormality         Status                     ---------                               -----------         ------                     CBC with platelets and d...[[id number]]  Abnormal            Final result                 Please view results for these  tests on the individual orders.   CBC with platelets and differential   Result Value Ref Range    WBC Count 15.9 (H) 4.0 - 11.0 10e3/uL    RBC Count 4.20 (L) 4.40 - 5.90 10e6/uL    Hemoglobin 13.7 13.3 - 17.7 g/dL    Hematocrit 40.5 40.0 - 53.0 %    MCV 96 78 - 100 fL    MCH 32.6 26.5 - 33.0 pg    MCHC 33.8 31.5 - 36.5 g/dL    RDW 11.5 10.0 - 15.0 %    Platelet Count 216 150 - 450 10e3/uL    % Neutrophils 94 %    % Lymphocytes 4 %    % Monocytes 2 %    % Eosinophils 0 %    % Basophils 0 %    % Immature Granulocytes 0 %    NRBCs per 100 WBC 0 <1 /100    Absolute Neutrophils 14.9 (H) 1.6 - 8.3 10e3/uL    Absolute Lymphocytes 0.6 (L) 0.8 - 5.3 10e3/uL    Absolute Monocytes 0.4 0.0 - 1.3 10e3/uL    Absolute Eosinophils 0.0 0.0 - 0.7 10e3/uL    Absolute Basophils 0.0 0.0 - 0.2 10e3/uL    Absolute Immature Granulocytes 0.1 (H) <=0.0 10e3/uL    Absolute NRBCs 0.0 10e3/uL   Extra Green Top (Lithium Heparin) Tube   Result Value Ref Range    Hold Specimen [facility]    Comprehensive metabolic panel   Result Value Ref Range    Sodium 135 133 - 144 mmol/L    Potassium 3.7 3.4 - 5.3 mmol/L    Chloride 100 94 - 109 mmol/L    Carbon Dioxide (CO2) 26 20 - 32 mmol/L    Anion Gap 9 3 - 14 mmol/L    Urea Nitrogen 14 7 - 30 mg/dL    Creatinine 0.88 0.66 - 1.25 mg/dL    Calcium 9.1 8.5 - 10.1 mg/dL    Glucose 162 (H) 70 - 99 mg/dL    Alkaline Phosphatase 162 (H) 40 - 150 U/L    AST 1,014 (HH) 0 - 45 U/L     (HH) 0 - 70 U/L    Protein Total 7.5 6.8 - 8.8 g/dL    Albumin 3.8 3.4 - 5.0 g/dL    Bilirubin Total 2.5 (H) 0.2 - 1.3 mg/dL    GFR Estimate >90 >60 mL/min/1.73m2   Lipase   Result Value Ref Range    Lipase 365 73 - 393 U/L   Troponin I   Result Value Ref Range    Troponin I High Sensitivity 5 <79 ng/L   Troponin I   Result Value Ref Range    Troponin I <0.015 0.000 - 0.045 ug/L   CK total   Result Value Ref Range     30 - 300 U/L   Alcohol ethyl   Result Value Ref Range    Alcohol ethyl 0.08 (H) <=0.01 g/dL   Symptomatic  Influenza A/B & SARS-CoV2 (COVID-19) Virus PCR Multiplex Nasopharyngeal    Specimen: Nasopharyngeal; Swab   Result Value Ref Range    Influenza A PCR Negative Negative    Influenza B PCR Negative Negative    SARS CoV2 PCR Negative Negative    Narrative    Testing was performed using the purnima SARS-CoV-2 & Influenza A/B Assay on the purnima Ariana System. This test should be ordered for the detection of SARS-CoV-2 and influenza viruses in individuals who meet clinical and/or epidemiological criteria. Test performance is unknown in asymptomatic patients. This test is for in vitro diagnostic use under the FDA EUA for laboratories certified under CLIA to perform moderate and/or high complexity testing. This test has not been FDA cleared or approved. A negative result does not rule out the presence of PCR inhibitors in the specimen or target RNA in concentration below the limit of detection for the assay. If only one viral target is positive but coinfection with multiple targets is suspected, the sample should be re-tested with another FDA cleared, approved or authorized test, if coinfection would change clinical management. [facility] are certified under the Clinical Laboratory Improvement Amendments of 1988 (CLIA-88) as  qualified to perform moderate and/or high complexity laboratory testing.   EKG 12-lead, tracing only   Result Value Ref Range    Systolic Blood Pressure  mmHg    Diastolic Blood Pressure  mmHg    Ventricular Rate 109 BPM    Atrial Rate 109 BPM    VA Interval 190 ms    QRS Duration 74 ms     ms    QTc 420 ms    P Axis 61 degrees    R AXIS 63 degrees    T Axis 48 degrees    Interpretation ECG       Sinus tachycardia  Otherwise normal ECG  When compared with ECG of 06-JUN-2021 18:48,  No significant change was found     CT Abdomen Pelvis w Contrast    Narrative    EXAM: CT ABDOMEN PELVIS W CONTRAST  LOCATION: [facility]  DATE/TIME: 11/25/2021 8:41  PM    INDICATION: Epigastric abdominal pain with nausea and vomiting  COMPARISON: 6/6/2021  TECHNIQUE: CT scan of the abdomen and pelvis was performed following injection of IV contrast. Multiplanar reformats were obtained. Dose reduction techniques were used.  CONTRAST: 100 mL Isovue-370    FINDINGS:   LOWER CHEST: Mild fibroatelectasis. No focal infiltrate or effusion.    HEPATOBILIARY: Gallstones in the gallbladder neck with no adjacent inflammatory change. Liver normal.    PANCREAS: Normal.    SPLEEN: Normal.    ADRENAL GLANDS: Normal.    KIDNEYS/BLADDER: Small left renal parapelvic and cortical cysts requiring no further evaluation, otherwise normal.    BOWEL: Diverticulosis without diverticulitis. Equivocal circumferential colonic wall thickening involving ascending colon could be artifact from incomplete distention. No bowel obstruction. No acute inflammatory process involves the GI tract.    LYMPH NODES: Normal.    VASCULATURE: Unremarkable.    PELVIC ORGANS: Normal.    MUSCULOSKELETAL: Small fat-containing left inguinal hernia. Degenerative disease.      Impression    IMPRESSION:   1.  Cholelithiasis without adjacent inflammatory change.  2.  Diverticulosis without diverticulitis. Equivocal circumferential thickening of right colon wall. If not performed recently, GI consult recommended to consider colonoscopy to exclude underlying mass, although this could be artifact from incomplete   distention.  3.  Small fat-containing left inguinal hernia.    NOTE: ABNORMAL REPORT    THE DICTATION ABOVE DESCRIBES AN ABNORMALITY FOR WHICH FOLLOW-UP IS NEEDED.    XR Chest 2 Views    Narrative    EXAM: XR CHEST 2 VW  LOCATION: [facility]  DATE/TIME: 11/25/2021 8:50 PM    INDICATION: cough, recent pna  COMPARISON: 06/06/2021      Impression    IMPRESSION: No change. Few tiny calcified granuloma present left mid lung. Lungs otherwise clear. Heart size and pulmonary vessels are normal.   US Abdomen  "Limited (RUQ)    Narrative    EXAM: US ABDOMEN LIMITED  LOCATION: [facility]  DATE/TIME: 11/25/2021 10:29 PM    INDICATION: Abdominal pain with gallstones noted on CT  COMPARISON: CT earlier today  TECHNIQUE: Limited abdominal ultrasound.    FINDINGS:    GALLBLADDER: Cholelithiasis. Wall mildly thickened to 4 mm. Negative sonographic Casiano's.    BILE DUCTS: No biliary dilatation. The common duct is obscured from bowel gas however it was normal on CT.    LIVER: Hepatic steatosis. No focal mass.    RIGHT KIDNEY: No hydronephrosis.    PANCREAS: Completely obscured from bowel gas.    No ascites.      Impression    IMPRESSION:  1.  Cholelithiasis with mild gallbladder wall thickening. Negative sonographic Casiano's.  2.  Hepatic steatosis.       Surgery General IP Consult: Patient to be seen: Routine within 24 hrs; Cholelithiasis; Consultant may enter orders: Yes; Requesting provider? Hospitalist (if different from attending physician)    Narrative    Abigail Rodriguez MD     11/26/2021 11:02 AM  SURGICAL CONSULTATION   REQUESTING PROVIDER:  Dr Estrella   HISTORY OF PRESENT ILLNESS:  I was asked by Dr. Estrella to see   Elfego Pickens who is a 61 year old male with a sudden onset of   several hours of  abdominal pain which is located in the   epigastric and RUQ.    The pain does radiate to his back.  It   does wake him from sleep.  It is associated with  bloating and   chills. He does not have a history of fatty food intolerance.     He has a history of jaundice from Hepatitis in 8th grade.  No   prior episodes of pancreatitis. Currently, his family reports his   color is \"off\". Prior abdominal surgery includes none.   PAST MEDICAL HISTORY:  has a past medical history of Benign   essential hypertension, Hepatitis B infection, Hyperlipidemia LDL   goal <100, Osteoarthritis, Paroxysmal atrial fibrillation (H),   and Peripheral neuropathy.  Smoking History:  has never smoked.    PAST SURGICAL HISTORY:  "   Past Surgical History:   Procedure Laterality Date     TOTAL KNEE ARTHROPLASTY Right      MEDICATIONS: No current facility-administered medications on file   prior to encounter.  amLODIPine (NORVASC) 10 MG tablet, Take 10 mg by mouth daily  Ascorbic Acid (VITAMIN C PO), Take 1 tablet by mouth daily  B Complex Vitamins (B COMPLEX PO), Take 1 tablet by mouth daily  cyclobenzaprine (FLEXERIL) 10 MG tablet, Take 10 mg by mouth 3   times daily as needed for muscle spasms  diphenhydrAMINE (BENADRYL) 25 MG capsule, Take 25 mg by mouth   daily as needed for itching or allergies  DULoxetine HCl 40 MG CPEP, Take 1 capsule by mouth daily  gabapentin (NEURONTIN) 300 MG capsule, Take 900 mg by mouth 3   times daily  lisinopril-hydrochlorothiazide (ZESTORETIC) 20-25 MG tablet, Take   1 tablet by mouth daily  metoprolol succinate ER (TOPROL-XL) 100 MG 24 hr tablet, Take 150   mg by mouth daily  omega 3 1000 MG CAPS, Take 1 capsule by mouth daily  sildenafil (REVATIO) 20 MG tablet, Take  mg by mouth once   as needed (30-60 minutes prior to sexual intercourse)  simvastatin (ZOCOR) 20 MG tablet, Take 20 mg by mouth At Bedtime  VITAMIN D PO, Take 1 tablet by mouth daily  ZINC SULFATE PO, Take 1 tablet by mouth daily                                        amLODIPine  10 mg Oral Daily     DULoxetine HCl  1 capsule Oral Daily     folic acid  1 mg Oral Daily     gabapentin  900 mg Oral TID     lisinopril-hydrochlorothiazide  1 tablet Oral Daily     metoprolol succinate ER  150 mg Oral Daily     multivitamin w/minerals  1 tablet Oral Daily     piperacillin-tazobactam  3.375 g Intravenous Q6H     sodium chloride (PF)  3 mL Intracatheter Q8H     thiamine  100 mg Oral Daily     FAMILY HISTORY:    Reviewed and non-contributory  ALLERGIES: This patient is allergic to has No Known Allergies.   REVIEW OF SYSTEMS: Negative except the HPI.   PHYSICAL EXAMINATION:   GENERAL: Pleasant, well-developed, well-nourished male, not   ill-appearing  but does appear to be faintly jaundiced.   /53 (BP Location: Left arm)   Pulse 102   Temp 99  F   (37.2  C) (Oral)   Resp 16   Wt 122.6 kg (270 lb 3.2 oz)     SpO2 94%   BMI 35.65 kg/m    HEENT: Normocephalic, atraumatic. No scleral icterus.   NECK/LYMPH: Supple.  No adenopathy.   LUNGS: Respirations unlabored.   CARDIOVASCULAR: Regular rhythm and rate, no murmurs.  ABDOMEN:  Abdomen is flat. Tenderness, mild and  epigastric.  The   gallbladder is non-palpable and mildly tender. hypoactive bowel   sounds.  No hernia or scars noted.   EXTREMITIES: Without edema or deformity.  NEUROLOGIC: Alert and oriented, moves all extremities with good   strength. Speech clear.   LABORATORY DATA: WBC-   WBC   Date Value Ref Range Status   06/07/2021 7.4 4.0 - 11.0 10e9/L Final     WBC Count   Date Value Ref Range Status   11/25/2021 15.9 (H) 4.0 - 11.0 10e3/uL Final   , HgB-   Hemoglobin   Date Value Ref Range Status   11/25/2021 13.7 13.3 - 17.7 g/dL Final   06/07/2021 13.0 (L) 13.3 - 17.7 g/dL Final     Liver function studies: Total Bilirubin 2.5, Alkaline Phosphatase   162, , AST 1014, Lipase 365.    IMAGING:  All imaging personally reviewed  CT scan of the abdomen:   IMPRESSION:  1.  Cholelithiasis with mild gallbladder wall thickening.   Negative sonographic Casiano's.  2.  Hepatic steatosis.  CT scan interpreted by radiologist     Gallbladder ultrasound:   IMPRESSION:  1. Cholelithiasis with mild gallbladder wall thickening. Negative   sonographic Casiano's.  2. Hepatic steatosis.           ASSESSMENT AND PLAN: Elfego Pickens  has symptomatic   cholelithiasis and markedly abnormal LFTs, as well as mild   jaundice.  I am recommending him for laparoscopic cholecystectomy   after the duct is cleared by ERCP.  He is scheduled for MRCP, but   it seems clear that he will need ERCP.  I would recommend surgery   for his lap anisa to be done several weeks after the successful   ERCP, to allow the LFTs to recover.  I  will message my    about getting him scheduled as an outpatient, assuming he will   need ERCP first.   I discussed the risks of the procedure   including incisional related complications like hernia and   infection as well as the small chance for post-cholecystectomy   diarrhea, or the need to convert to an open cholecystectomy.  We   also discussed rare complications such as bile leak, bowel or   bile duct injury.  Elfego is interested in proceeding with   surgery in the next few weeks.  HU GUERRERO MD     Please route or send letter to:  Primary Care Provider (PCP) and Referring Provider    40 minutes total time spent on the date of this encounter doing:    Chart review, review of test results, patient visit, physical   exam, education, counseling, developing plan of care, and   documenting.   Gastroenterology IP Consult: Choledocholithiasis - Called in ED; Consultant may enter orders: Yes; Patient to be seen: Routine - within 24 hours; Requesting provider? Hospitalist (if different from attending physician)    Narrative    Wendy Das MD     11/26/2021 12:37 PM  [facility]  [facility]  Gastroenterology Consultation    Elfego Pickens  [address] [place] [address]  61 year old male    Admission Date/Time: 11/25/2021  Primary Care Provider: [facility], [place]    We were asked to see the patient in consultation by Dr. Estrella for   evaluation of choledocholithiasis.    HPI:  Elfego Pickens is a 61 year old male with PMH including   atrial fibrillation (not on anticoagulation, hypertension, and   hyperlipidemia who was admitted 11/25/21 with abdominal pain.    Patient reports new onset abdominal pain that started yesterday   after eating. This is located in the mid abdomen and radiates   into the right flank and lower back. It is associated with   bloating. No nausea or vomiting. Patient had been having diarrhea   for about 1 week but this has  improved. Sometimes sees bright red   blood when wiping, but no profuse rectal bleeding. No fevers.    Labs on admission significant for WBC 15.9, , AST 1014,   , bilirubin 2.5. CT scan showed cholelithiasis, normal   CBD, equivocal circumferential thickening in the right colon. US   showed cholelithiasis, mild GB thickening, no biliary dilation   although CBD obscured. MRCP is pending.    Patient reports his pain is feeling better this morning. His last   colonoscopy was at age 49. He drank 2-3 alcoholic beverages   yesterday and BAC 0.08 on admission. Typically has 1-2 alcoholic   beverages most days.      ROS: A comprehensive ten point review of systems was negative   aside from those in mentioned in the HPI.      MEDICATIONS: No current facility-administered medications on file   prior to encounter.  amLODIPine (NORVASC) 10 MG tablet, Take 10 mg by mouth daily  Ascorbic Acid (VITAMIN C PO), Take 1 tablet by mouth daily  B Complex Vitamins (B COMPLEX PO), Take 1 tablet by mouth daily  cyclobenzaprine (FLEXERIL) 10 MG tablet, Take 10 mg by mouth 3   times daily as needed for muscle spasms  diphenhydrAMINE (BENADRYL) 25 MG capsule, Take 25 mg by mouth   daily as needed for itching or allergies  DULoxetine HCl 40 MG CPEP, Take 1 capsule by mouth daily  gabapentin (NEURONTIN) 300 MG capsule, Take 900 mg by mouth 3   times daily  lisinopril-hydrochlorothiazide (ZESTORETIC) 20-25 MG tablet, Take   1 tablet by mouth daily  metoprolol succinate ER (TOPROL-XL) 100 MG 24 hr tablet, Take 150   mg by mouth daily  omega 3 1000 MG CAPS, Take 1 capsule by mouth daily  sildenafil (REVATIO) 20 MG tablet, Take  mg by mouth once   as needed (30-60 minutes prior to sexual intercourse)  simvastatin (ZOCOR) 20 MG tablet, Take 20 mg by mouth At Bedtime  VITAMIN D PO, Take 1 tablet by mouth daily  ZINC SULFATE PO, Take 1 tablet by mouth daily      ALLERGIES: No Known Allergies    Past Medical History:   Diagnosis  Date     Benign essential hypertension      Hepatitis B infection      Hyperlipidemia LDL goal <100      Osteoarthritis      Paroxysmal atrial fibrillation (H)      Peripheral neuropathy        Past Surgical History:   Procedure Laterality Date     TOTAL KNEE ARTHROPLASTY Right        SOCIAL HISTORY:  Social History     Tobacco Use     Smoking status: Never Smoker     Smokeless tobacco: Never Used   Substance Use Topics     Alcohol use: Yes     Comment: 3-4 drinks daily     Drug use: Never       FAMILY HISTORY:  No family history on file.    PHYSICAL EXAM:   /74 (BP Location: Left arm)   Pulse 98   Temp 98.9  F   (37.2  C) (Oral)   Resp 12   Wt 122.6 kg (270 lb 3.2 oz)     SpO2 93%   BMI 35.65 kg/m      Constitutional: No acute distress.  Head: Normocephalic, atraumatic.    Neck: Neck supple. No adenopathy.  Eyes: No scleral icterus.  ENT: Hearing adequate. Pharynx normal without erythema or   exudate.  Cardiovascular: RRR, normal S1/S2, no murmurs.  Respiratory: Nonlabored, CTAB.  Abdomen: Soft, bloated. Mild central/right-sided tenderness.  Neuro: CN II-XII grossly intact. No focal deficits.  Extremities: No edema.  Skin: No suspicious lesions, rashes, or jaundice.  Psychiatric: Mentation appears normal and affect normal.      ADDITIONAL COMMENTS:   I reviewed the patient's new clinical lab test results.   Recent Labs   Lab Test 11/25/21 1903 06/07/21 0611 06/06/21 1907   WBC 15.9* 7.4 6.3   HGB 13.7 13.0* 13.4   MCV 96 95 93    152 214     Recent Labs   Lab Test 11/25/21 1904 06/07/21 0611 06/06/21 1907    142 133   POTASSIUM 3.7 4.0 3.7   CHLORIDE 100 112* 100   CO2 26 22 24   BUN 14 21 24   CR 0.88 1.09 1.67*   ANIONGAP 9 8 9   MEENAKSHI 9.1 7.7* 8.6   * 116* 117*     Recent Labs   Lab Test 11/25/21 1904 06/07/21 0611 06/06/21 1907 06/06/21  0725   ALBUMIN 3.8 3.2* 3.7  --    BILITOTAL 2.5* 0.3 0.3  --    * 31 35  --    AST 1,014* 24 26  --    ALKPHOS 162* 64 69  --     PROTEIN  --   --   --  Negative   LIPASE 365  --   --   --        IMAGING/ENDOSCOPY    CTAP 11/25/21   - Cholelithiasis without adjacent inflammatory change.   - Diverticulosis without diverticulitis. Equivocal   circumferential thickening of right colon wall. If not performed   recently, GI consult recommended to consider colonoscopy to   exclude underlying mass, although this could be artifact from   incomplete distention.   - Small fat-containing left inguinal hernia.    RUQ US 11/25/21   - Cholelithiasis with mild gallbladder wall thickening. Negative   sonographic Casiano's.   - Hepatic steatosis.      CONSULTATION ASSESSMENT AND PLAN:    Elfego Pickens is a 61-year-old male with PMH as mentioned above   who was admitted 11/25/21 with abdominal pain. Imaging showed   cholelithiasis. LFTs elevated.    1) Cholelithiasis: Elevated LFTs and bilirubin raise suspicion   for choledocholithiasis, although pattern of elevation is   atypical for biliary etiology. CBD appeared normal on CT but   obscured on ultrasound. MRCP is pending.        - Trend LFTs.        - Await MRCP results. If abnormal, may need ERCP.        - If MRCP normal, may consider workup for other causes of   LFT elevations since the pattern is atypical for biliary   obstruction.        - General surgery consulted for possible cholecystectomy.    2) Colonic wall thickening: Equivocal right-sided colon   thickening on CT scan. Patient reports some scant rectal bleeding   over the past few weeks, most consistent with hemorrhoids.   Because last colonoscopy was at age 49, I encouraged patient to   schedule colonoscopy as outpatient once acute issues resolved.         - Outpatient colonoscopy. [facility] will arrange.    I discussed the patient plan with Dr. Das. Thank you for asking   us to participate in the care of this patient.    Ashlyn Crawford PA-C  [facility] ([facility])     -------------------  I agree with the assessment and plan of  Ashlyn GRANT.    Patient reports abdominal pain is better, however is still   requiring pain medications for this.  On exam NAD, non-labored   breathing, abd is soft,nt,nd, no guarding.    A/P  Admitted with abdominal pain, abnormal LFTs, cholelithiasis,   however no biliary duct dilation.  Having MRCP this afternoon to   evaluate bile ducts further.  Plan as above.      We will continue to follow.    Lor Das MD       Magnesium   Result Value Ref Range    Magnesium 1.7 1.6 - 2.3 mg/dL   Phosphorus   Result Value Ref Range    Phosphorus 4.2 2.5 - 4.5 mg/dL

## 2021-11-26 NOTE — ED PROVIDER NOTES
"  History   Chief Complaint:  Abdominal Pain     HPI   Elfego Pickens is a 61 year old male with history of atrial fibrillation and hypertension who presents with sudden onset abdominal pain that started earlier today. States he felt good when he woke up this morning and as he went about his normal routine. Sometime later in the afternoon he started to feel like his \"guts were on fire.\" States he tried to eat something, which did not help, and drank some pepto bismal, which helped briefly. He also notes a cough (similar to one he had over a week ago), shortness of breath, abdominal distention, and intermittent headache. He did not take his temperature today, although he notes he felt very hot. He denies vomiting, urinary problems, and sore throat. No history of abdominal surgery.     The patient also notes he had severe diarrhea 2 weeks ago with blood in his stool.     Review of Systems   HENT: Negative for sore throat.    Gastrointestinal: Positive for abdominal pain and nausea.   Neurological: Positive for headaches.   All other systems reviewed and are negative.    Allergies:  The patient has no known allergies.     Medications:  Norvasc  Neurontin  Zocor  Duloxetine  Toprol XL  Prinzide    Past Medical History:     Acute kidney injury  Acute respiratory failure with hypoxia  Alcohol intoxication with complication  Pneumonia of both lower lobes  Osteoarthritis bilateral hips  Osteoarthritis bilateral knees  Paresthesia bilateral feet  Condyloma acuminata  Diverticulosis, large intestine  Asymptomatic varicose veins  Hyperlipidemia  Hypertension  Asthma  Atrial fibrillation    Past Surgical History:    Knee surgery, bilateral  Vasectomy  Varicose vein surgery, bilateral  Corneal surgery, bilateral  Cardioversion    Family History:    Father: ALS, cancer, diabetes  Mother: breast cancer, dementia, hypertension, hyperlipidemia    Social History:  The patient presents to the ED with his wife.   The patient is COVID " vaccinated.     Physical Exam     Patient Vitals for the past 24 hrs:   BP Temp Pulse Resp SpO2   11/25/21 1945 (!) 152/78 -- -- -- 93 %   11/25/21 1920 (!) 150/94 -- 110 -- 94 %   11/25/21 1915 (!) 150/94 -- -- -- 94 %   11/25/21 1907 (!) 149/83 -- 108 -- 93 %   11/25/21 1823 (!) 168/93 97.8  F (36.6  C) 107 18 96 %     Physical Exam  General: Patient is awake, alert and interactive when I enter the room. Appears uncomfortable.   Head: The scalp, face, and head appear normal  CV: tachycardiac but regular   Resp: Lungs are clear without wheezes or rales. No respiratory distress.   GI: epigastric tenderness without guarding or rebound. No hernias or bruising are noted in detailed exam. No CVA tenderness.     MS: Normal tone. Joints grossly normal without effusions. No asymmetric leg swelling, calf or thigh tenderness.    Skin: No rash or lesions noted. Normal capillary refill noted  Neuro: Speech is normal and fluent. Face is symmetric. Moving all extremities.   Psych:  Normal affect.  Appropriate interactions.    Emergency Department Course   ECG  ECG obtained at 1918, ECG read at 1925  Sinus tachycardia. Otherwise normal ECG.    Increased rate as compared to prior, dated 6/6/21.  Rate 109 bpm. MA interval 190 ms. QRS duration 74 ms. QT/QTc 312/420 ms. P-R-T axes 61 63 48.     Imaging:  US Abdomen Limited (RUQ)   Final Result   IMPRESSION:   1.  Cholelithiasis with mild gallbladder wall thickening. Negative sonographic Casiano's.   2.  Hepatic steatosis.            XR Chest 2 Views   Final Result   IMPRESSION: No change. Few tiny calcified granuloma present left mid lung. Lungs otherwise clear. Heart size and pulmonary vessels are normal.      CT Abdomen Pelvis w Contrast   Final Result   IMPRESSION:    1.  Cholelithiasis without adjacent inflammatory change.   2.  Diverticulosis without diverticulitis. Equivocal circumferential thickening of right colon wall. If not performed recently, GI consult recommended to  consider colonoscopy to exclude underlying mass, although this could be artifact from incomplete    distention.   3.  Small fat-containing left inguinal hernia.      NOTE: ABNORMAL REPORT      THE DICTATION ABOVE DESCRIBES AN ABNORMALITY FOR WHICH FOLLOW-UP IS NEEDED.         Report per radiology    Laboratory:  Labs Ordered and Resulted from Time of ED Arrival to Time of ED Departure   COMPREHENSIVE METABOLIC PANEL - Abnormal       Result Value    Sodium 135      Potassium 3.7      Chloride 100      Carbon Dioxide (CO2) 26      Anion Gap 9      Urea Nitrogen 14      Creatinine 0.88      Calcium 9.1      Glucose 162 (*)     Alkaline Phosphatase 162 (*)     AST 1,014 (*)      (*)     Protein Total 7.5      Albumin 3.8      Bilirubin Total 2.5 (*)     GFR Estimate >90     CBC WITH PLATELETS AND DIFFERENTIAL - Abnormal    WBC Count 15.9 (*)     RBC Count 4.20 (*)     Hemoglobin 13.7      Hematocrit 40.5      MCV 96      MCH 32.6      MCHC 33.8      RDW 11.5      Platelet Count 216      % Neutrophils 94      % Lymphocytes 4      % Monocytes 2      % Eosinophils 0      % Basophils 0      % Immature Granulocytes 0      NRBCs per 100 WBC 0      Absolute Neutrophils 14.9 (*)     Absolute Lymphocytes 0.6 (*)     Absolute Monocytes 0.4      Absolute Eosinophils 0.0      Absolute Basophils 0.0      Absolute Immature Granulocytes 0.1 (*)     Absolute NRBCs 0.0     ETHYL ALCOHOL LEVEL - Abnormal    Alcohol ethyl 0.08 (*)    INFLUENZA A/B & SARS-COV2 PCR MULTIPLEX - Normal    Influenza A PCR Negative      Influenza B PCR Negative      SARS CoV2 PCR Negative     LIPASE - Normal    Lipase 365     TROPONIN I - Normal    Troponin I High Sensitivity 5     TROPONIN I - Normal    Troponin I <0.015     CK TOTAL        Procedures    Emergency Department Course:  Reviewed:  I reviewed nursing notes, vitals, past medical history, Care Everywhere and [facility]    Assessments:  1855 I obtained history and examined the patient as noted  above.   2036 I rechecked the patient and explained findings.   2115 I rechecked the patient and explained findings. He is feeling a little bit better.   2311 I rechecked the patient and explained findings.   0018 I rechecked the patient and explained plan going forward.     Consults:  2314 I spoke with Dr. Rubio, gastroenterology, about the patient's presentation.  0007 I spoke with Dr. Lucas, the Hospitalist, who accepted the patient.     Interventions:  1927 Zofran 4mg IV  1927 Pepcid 20mg IV  1927 0.9% sodium chloride BOLUS 1000mL IV     Disposition:  The patient was admitted to the hospital under the care of Dr. Lucas.     Impression & Plan     Medical Decision Making:  This is a 61-year-old gentleman who presents emergency department with sudden onset epigastric pain that began earlier today.  Has not had similar pain like this before in the past.  No history of abdominal surgeries.  Upon initial evaluation here he is tachycardic but otherwise hemodynamically stable.  On exam patient has significant tenderness in his epigastrium along with some mild distention.  Evaluation began with a CT scan of the patient's abdomen and pelvis.  This revealed evidence of cholelithiasis without clear evidence of cholecystitis.  Blood work returned during a similar time.  Which revealed evidence concerning for possible biliary obstruction.  ASTs, ALTs, alk phos and bilirubin are all elevated.  However AST is and ALTs are elevated in a 2:1 ratio more consistent with alcoholic hepatitis.  Although this may also be possible, choledocholithiasis still must be ruled out.  Right upper quadrant ultrasound was obtained which shows evidence of cholelithiasis with mild gallbladder wall thickening but no additional signs of cholecystitis.  Unfortunately common bile duct was obscured by bowel gas and was unable to be estimated. I discussed the case with gastroenterology who recommended for likely ERCP in the morning.  Unfortunately,  [facility] does not perform ERCP procedure on Fridays.  Therefore transferred to the [facility] or [facility] was explored.  Unfortunately there is no bed availability at this time.  Patient will be admitted to our hospital here with further GI consultation in the morning.  If ERCP is recommended further logistics will be required.  Prophylactic antibiotics to cover for cholangitis were administered.    Diagnosis:    ICD-10-CM    1. Biliary obstruction  K83.1        Scribe Disclosure:  Meena HI, wally serving as a scribe at 6:55 PM on 11/25/2021 to document services personally performed by Jae Win MD based on my observations and the provider's statements to me.      Jae Win MD  11/26/21 0041       Jae Win MD  11/26/21 0041

## 2021-11-26 NOTE — PLAN OF CARE
Care from     Temp: 99  F (37.2  C)  Temp src: Oral  BP: 117/53  Pulse: 102  Resp: 16  SpO2: 94 % O2 3L/oxymask     Admit Date: 11/26/2021  Admitting Diagnosis: abdominal pain, cholecystitis  Pertinent History: benign HTN, osteoarthritis, PAF, peripheral neuropathy (non DM related), ^ lipids, hep b infection, essential tremors    Isolation Precautions:   none  Neuro: Alert and Oriented x4  Activity: are independent with no assistive devices   Telemetry Monitoring: No  Pain: complaining of 5/10 pain in their abdominal pain.  Given iv dilaudid  Labs / Tests: BC x 2 drawn, ast 1014, alt 508, bili 2.5, wbc 15.9  GI: hypoactive BS, states is passing flatus, abdomen is taut and states is tender  : wnl  Medications: dilaudid 0.2- 0.4 mg IV prn, initated O2 at 3L/oxymask after giving dilaudid IV, O2 saturation was at 89, now at 94%  Misc: CIWA scoring of 3  LDA's: Peripheral  Fluids: has Lactated Ringer's running at 150 mL per hour.  Diet: NPO  Living Situation: lives with spouse in a home  Consults: GI and General Surgery  Discharge Disposition: Home with Self care, TBD for date    Plan of Care:  dependent on treatment plan.

## 2021-11-27 VITALS
TEMPERATURE: 98.9 F | DIASTOLIC BLOOD PRESSURE: 72 MMHG | OXYGEN SATURATION: 94 % | RESPIRATION RATE: 18 BRPM | HEART RATE: 85 BPM | WEIGHT: 270.2 LBS | SYSTOLIC BLOOD PRESSURE: 114 MMHG | BODY MASS INDEX: 35.65 KG/M2

## 2021-11-27 LAB
ALBUMIN SERPL-MCNC: 3 G/DL (ref 3.4–5)
ALP SERPL-CCNC: 140 U/L (ref 40–150)
ALT SERPL W P-5'-P-CCNC: 324 U/L (ref 0–70)
ANION GAP SERPL CALCULATED.3IONS-SCNC: 5 MMOL/L (ref 3–14)
AST SERPL W P-5'-P-CCNC: 167 U/L (ref 0–45)
BASOPHILS # BLD AUTO: 0 10E3/UL (ref 0–0.2)
BASOPHILS NFR BLD AUTO: 0 %
BILIRUB SERPL-MCNC: 5.2 MG/DL (ref 0.2–1.3)
BUN SERPL-MCNC: 6 MG/DL (ref 7–30)
CALCIUM SERPL-MCNC: 9 MG/DL (ref 8.5–10.1)
CHLORIDE BLD-SCNC: 100 MMOL/L (ref 94–109)
CO2 SERPL-SCNC: 28 MMOL/L (ref 20–32)
CREAT SERPL-MCNC: 0.7 MG/DL (ref 0.66–1.25)
EOSINOPHIL # BLD AUTO: 0.1 10E3/UL (ref 0–0.7)
EOSINOPHIL NFR BLD AUTO: 2 %
ERYTHROCYTE [DISTWIDTH] IN BLOOD BY AUTOMATED COUNT: 11.9 % (ref 10–15)
GFR SERPL CREATININE-BSD FRML MDRD: >90 ML/MIN/1.73M2
GLUCOSE BLD-MCNC: 112 MG/DL (ref 70–99)
HCT VFR BLD AUTO: 39 % (ref 40–53)
HGB BLD-MCNC: 13 G/DL (ref 13.3–17.7)
IMM GRANULOCYTES # BLD: 0 10E3/UL
IMM GRANULOCYTES NFR BLD: 1 %
LYMPHOCYTES # BLD AUTO: 0.5 10E3/UL (ref 0.8–5.3)
LYMPHOCYTES NFR BLD AUTO: 8 %
MCH RBC QN AUTO: 32.8 PG (ref 26.5–33)
MCHC RBC AUTO-ENTMCNC: 33.3 G/DL (ref 31.5–36.5)
MCV RBC AUTO: 99 FL (ref 78–100)
MONOCYTES # BLD AUTO: 0.3 10E3/UL (ref 0–1.3)
MONOCYTES NFR BLD AUTO: 5 %
NEUTROPHILS # BLD AUTO: 5.3 10E3/UL (ref 1.6–8.3)
NEUTROPHILS NFR BLD AUTO: 84 %
NRBC # BLD AUTO: 0 10E3/UL
NRBC BLD AUTO-RTO: 0 /100
PLATELET # BLD AUTO: 161 10E3/UL (ref 150–450)
POTASSIUM BLD-SCNC: 3.4 MMOL/L (ref 3.4–5.3)
PROT SERPL-MCNC: 6.9 G/DL (ref 6.8–8.8)
RBC # BLD AUTO: 3.96 10E6/UL (ref 4.4–5.9)
SODIUM SERPL-SCNC: 133 MMOL/L (ref 133–144)
WBC # BLD AUTO: 6.3 10E3/UL (ref 4–11)

## 2021-11-27 PROCEDURE — 99232 SBSQ HOSP IP/OBS MODERATE 35: CPT | Performed by: SURGERY

## 2021-11-27 PROCEDURE — 250N000013 HC RX MED GY IP 250 OP 250 PS 637: Performed by: INTERNAL MEDICINE

## 2021-11-27 PROCEDURE — G0378 HOSPITAL OBSERVATION PER HR: HCPCS

## 2021-11-27 PROCEDURE — 258N000003 HC RX IP 258 OP 636: Performed by: PHYSICIAN ASSISTANT

## 2021-11-27 PROCEDURE — 80053 COMPREHEN METABOLIC PANEL: CPT | Performed by: INTERNAL MEDICINE

## 2021-11-27 PROCEDURE — 82040 ASSAY OF SERUM ALBUMIN: CPT | Performed by: INTERNAL MEDICINE

## 2021-11-27 PROCEDURE — 85025 COMPLETE CBC W/AUTO DIFF WBC: CPT | Performed by: INTERNAL MEDICINE

## 2021-11-27 PROCEDURE — 36415 COLL VENOUS BLD VENIPUNCTURE: CPT | Performed by: INTERNAL MEDICINE

## 2021-11-27 PROCEDURE — 96376 TX/PRO/DX INJ SAME DRUG ADON: CPT

## 2021-11-27 PROCEDURE — 250N000011 HC RX IP 250 OP 636: Performed by: INTERNAL MEDICINE

## 2021-11-27 RX ORDER — ONDANSETRON 4 MG/1
4 TABLET, ORALLY DISINTEGRATING ORAL EVERY 6 HOURS PRN
Qty: 18 TABLET | Refills: 0 | Status: SHIPPED | OUTPATIENT
Start: 2021-11-27

## 2021-11-27 RX ORDER — AMLODIPINE BESYLATE 10 MG/1
10 TABLET ORAL DAILY
Start: 2021-11-29

## 2021-11-27 RX ADMIN — HYDROMORPHONE HYDROCHLORIDE 0.4 MG: 0.2 INJECTION, SOLUTION INTRAMUSCULAR; INTRAVENOUS; SUBCUTANEOUS at 06:46

## 2021-11-27 RX ADMIN — TAZOBACTAM SODIUM AND PIPERACILLIN SODIUM 3.38 G: 375; 3 INJECTION, SOLUTION INTRAVENOUS at 00:43

## 2021-11-27 RX ADMIN — ACETAMINOPHEN 650 MG: 325 TABLET, FILM COATED ORAL at 09:20

## 2021-11-27 RX ADMIN — ACETAMINOPHEN 650 MG: 325 TABLET, FILM COATED ORAL at 00:11

## 2021-11-27 RX ADMIN — METOPROLOL SUCCINATE 150 MG: 50 TABLET, EXTENDED RELEASE ORAL at 09:22

## 2021-11-27 RX ADMIN — GABAPENTIN 900 MG: 300 CAPSULE ORAL at 09:21

## 2021-11-27 RX ADMIN — TAZOBACTAM SODIUM AND PIPERACILLIN SODIUM 3.38 G: 375; 3 INJECTION, SOLUTION INTRAVENOUS at 13:13

## 2021-11-27 RX ADMIN — TAZOBACTAM SODIUM AND PIPERACILLIN SODIUM 3.38 G: 375; 3 INJECTION, SOLUTION INTRAVENOUS at 06:46

## 2021-11-27 RX ADMIN — MULTIPLE VITAMINS W/ MINERALS TAB 1 TABLET: TAB at 09:20

## 2021-11-27 RX ADMIN — HYDROMORPHONE HYDROCHLORIDE 0.4 MG: 0.2 INJECTION, SOLUTION INTRAMUSCULAR; INTRAVENOUS; SUBCUTANEOUS at 09:18

## 2021-11-27 RX ADMIN — THIAMINE HCL TAB 100 MG 100 MG: 100 TAB at 09:21

## 2021-11-27 RX ADMIN — FOLIC ACID 1 MG: 1 TABLET ORAL at 09:21

## 2021-11-27 RX ADMIN — DULOXETINE 40 MG: 20 CAPSULE, DELAYED RELEASE ORAL at 09:23

## 2021-11-27 RX ADMIN — GABAPENTIN 900 MG: 300 CAPSULE ORAL at 13:04

## 2021-11-27 RX ADMIN — AMLODIPINE BESYLATE 10 MG: 10 TABLET ORAL at 13:14

## 2021-11-27 RX ADMIN — HYDROMORPHONE HYDROCHLORIDE 0.4 MG: 0.2 INJECTION, SOLUTION INTRAMUSCULAR; INTRAVENOUS; SUBCUTANEOUS at 13:04

## 2021-11-27 RX ADMIN — SODIUM CHLORIDE, POTASSIUM CHLORIDE, SODIUM LACTATE AND CALCIUM CHLORIDE: 600; 310; 30; 20 INJECTION, SOLUTION INTRAVENOUS at 05:23

## 2021-11-27 RX ADMIN — HYDROMORPHONE HYDROCHLORIDE 0.4 MG: 0.2 INJECTION, SOLUTION INTRAMUSCULAR; INTRAVENOUS; SUBCUTANEOUS at 02:13

## 2021-11-27 ASSESSMENT — ACTIVITIES OF DAILY LIVING (ADL)
ADLS_ACUITY_SCORE: 5
ADLS_ACUITY_SCORE: 6
ADLS_ACUITY_SCORE: 5
ADLS_ACUITY_SCORE: 6
ADLS_ACUITY_SCORE: 5
ADLS_ACUITY_SCORE: 5
ADLS_ACUITY_SCORE: 6
ADLS_ACUITY_SCORE: 5
ADLS_ACUITY_SCORE: 6
ADLS_ACUITY_SCORE: 6
ADLS_ACUITY_SCORE: 5
ADLS_ACUITY_SCORE: 5

## 2021-11-27 NOTE — PLAN OF CARE
A&Ox4. CMS intact. RA. Denies SOB. Up w/ SBA in room. BS active x4, remains NPO except for meds and ice chips, passing flatus. Denies nausea. C/o abdominal pain, lower back pain and mild headache. Given PRN po Tylenol and IV Dilaudid w/ relief. Continues IV Zosyn Q6H. CIWA scores: 3, 2. MRCP completed on 11/26. GI and General Surgery consulted, see notes. IVF infusing. Blood cultures pending. Will continue to provide supportive cares.

## 2021-11-27 NOTE — PROGRESS NOTES
[facility]    Hospitalist Progress Note      Assessment & Plan   Elfego Pickens is a 61 year old male with PMHx of paroxysmal A-fib, HLD, and HTN who was admitted on 11/25/2021 with abdominal pain.      #Acute cholecystitis with choledocholithiasis; hepatic steatosis: abnormal LFTs, concerning for obstructive etiology. GI consulted overnight. Recommended ERCP but no beds available for ERCP.   -GI consulted, felt LFTs atypical for biliary etiology, recommending MRCP, if abnormal then patient will need ERCP  - general surgery consulted, awaiting results of MRCP, assuming patient needs ERCP it would be recommended the patient have laparoscopic cholecystectomy done several weeks after   -NPO for possible procedure - patient may need to transfer for ERCP and return to [facility]  -Abx: continue Empiric Zosyn given gallbladder wall thickening and leukocytosis  -follow LFTs  -MRCP shows cholelithiasis and does not report evident intrahepatic bile duct sotne with CBD 5 mm.  -surgery recommends GI consider for ERCP      #Incidental CT finding of colonic wall thickening: CT abdomen/pelvis with circumferential thickening of right colon wall. Colonoscopy completed at age 49. Patient reports normal. Completed at [facility] 5/2011. Unable to see report. Patient has had intentional weight loss but unable to quantify amount.   - recommend outpatient colonoscopy, [facility] will arrange      #Alcohol abuse without acute alcohol withdrawal: Ethanol level 0.08 on admission. Patient reports drinking 3-4 drinks daily. He has gone 3-4 days without alcohol without complications.   -Monitor CIWA. If triggered notify provider. No current signs of withdrawal.   -Thiamine/Folate/MV  -magnesium and phosphorus WNL     #Hypertension: PTA amlodipine, lisinopril-hydrochlorothiazide, Metoprolol     #Hyperlipidemia: Hold statin given LFT abn.      #Peripheral neuropathy: PTA duloxetine/gabapentin     #Paroxysmal atrial fibrillation  s/p cardioversion (1/2020): Patient follows with Cardiology. CHADSVASC: 1. Rate controlled with Metoprolol 150 mg daily.       #Obesity: Complicates cares      COVID: tested negative on 11/25/21  DVT Prophylaxis: Pneumatic Compression Devices  Code Status: Full Code  Disposition: Expected discharge in 1-2 days pending recommendations from GI/general surgery     Mckayla Astorga PA-C      Interval History   Has intermittent abdominal discomfort,radiating to back but not as severe as presenting symptoms. Described as bloating. Requiring occasional dilaudid to control symptoms.  No nausea or vomiting.  LGF.  Discussed case with general surgery Dr. Medina wants GI to perform ERCP.       -Data reviewed today: I reviewed all new labs and imaging results over the last 24 hours.    Physical Exam   Temp: 99.5  F (37.5  C) Temp src: Oral BP: 138/76 Pulse: 84   Resp: 18 SpO2: 92 % O2 Device: None (Room air)    Vitals:    11/26/21 0139   Weight: 122.6 kg (270 lb 3.2 oz)     Vital Signs with Ranges  Temp:  [98.1  F (36.7  C)-99.5  F (37.5  C)] 99.5  F (37.5  C)  Pulse:  [81-95] 84  Resp:  [12-18] 18  BP: (134-144)/(70-76) 138/76  SpO2:  [92 %-95 %] 92 %  No intake/output data recorded.      Constitutional:Awake, alert, no apparent distress  Respiratory:  Normal work of breathing. Lungs clear to auscultation bilaterally, no crackles or wheezing.  Cardiovascular: Regular rate and rhythm, normal S1 and S2, and no murmur appreciated.   GI: Normal bowel sounds, soft, non-distended, non-tender.   Skin/Integument: No rashes, no cyanosis, no peripheral edema.  Neuro: Moving all extremities with normal strength. Coordination and sensation grossly intact. Speech clear. No focal deficits.   Psych: Appropriate affect.            Medications     lactated ringers 100 mL/hr at 11/27/21 0523       amLODIPine  10 mg Oral Daily     DULoxetine  40 mg Oral Daily     folic acid  1 mg Oral Daily     gabapentin  900 mg Oral TID      lisinopril-hydrochlorothiazide  1 tablet Oral Daily     metoprolol succinate ER  150 mg Oral Daily     multivitamin w/minerals  1 tablet Oral Daily     piperacillin-tazobactam  3.375 g Intravenous Q6H     sodium chloride (PF)  3 mL Intracatheter Q8H     thiamine  100 mg Oral Daily       Data   Recent Labs   Lab 11/27/21  0518 11/25/21  1904 11/25/21  1903   WBC 6.3  --  15.9*   HGB 13.0*  --  13.7   MCV 99  --  96     --  216    135  --    POTASSIUM 3.4 3.7  --    CHLORIDE 100 100  --    CO2 28 26  --    BUN 6* 14  --    CR 0.70 0.88  --    ANIONGAP 5 9  --    MEENAKSHI 9.0 9.1  --    * 162*  --    ALBUMIN 3.0* 3.8  --    PROTTOTAL 6.9 7.5  --    BILITOTAL 5.2* 2.5*  --    ALKPHOS 140 162*  --    * 508*  --    * 1,014*  --    LIPASE  --  365  --    TROPONIN  --  <0.015  --        Recent Results (from the past 24 hour(s))   MR Abdomen MRCP w/o & w Contrast    Narrative    EXAM: MR ABDOMEN MRCP W/O and W CONTRAST  LOCATION: [facility]  DATE/TIME: 11/26/2021 6:45 PM    INDICATION: Cholecystitis (Ped 0-18y)  COMPARISON: 11/25/2021 CT and ultrasound.  TECHNIQUE: Routine MR liver/pancreas protocol including axial and coronal MRCP sequences. 2D and 3D reconstruction performed by MR technologist including MIP reconstruction and slab cholangiograms. If performed with contrast, additional dynamic T1 post   IV contrast images.  CONTRAST: 10 mL Gadavist.     FINDINGS:     MRCP: Gallstones persist within gallbladder neck but there is no inflammatory gallbladder wall thickening or pericholecystic inflammation. Bile ducts are normal with CBD measuring 5 mm. No intrahepatic bile duct stone evident.    LIVER: Mild fatty infiltration of liver. No focal lesion evident.    PANCREAS: Normal.    ADDITIONAL FINDINGS: Small renal cysts.      Impression    IMPRESSION:  1.  Cholelithiasis.  2.  No inflammatory changes of gallbladder wall.  3.  Bile duct normal, no intraductal stones evident.

## 2021-11-27 NOTE — PROGRESS NOTES
[facility]  General Surgery Progress Note           Assessment and Plan:   Assessment:   CBD stone not seen on MRCP but bilirubin increased consistent with CBD stone      Plan:   -lap rafi after CBD cleared  GI to decide on CBD ERCP with increasingly elevated Bili         Interval History:   Continued abdominal and back pain but much better, feels he could go home         Physical Exam:   Blood pressure 138/76, pulse 84, temperature 99.5  F (37.5  C), temperature source Oral, resp. rate 18, weight 122.6 kg (270 lb 3.2 oz), SpO2 92 %.    No intake/output data recorded.    Eyes:   sclera clear     Neck:   skin      Abdomen:   soft, mildly distended, mild tenderness noted centrally, no tenderness in RUQ voluntary guarding absent and no masses palpated             Data:     Recent Labs   Lab 11/27/21  0518 11/25/21  1904   * 1,014*   * 508*   ALKPHOS 140 162*   BILITOTAL 5.2* 2.5*     Recent Labs   Lab 11/27/21 0518 11/25/21  1903   WBC 6.3 15.9*         Toney Medina MD

## 2021-11-27 NOTE — PLAN OF CARE
Temp: 99.5  F (37.5  C) Temp src: Oral BP: 138/76 Pulse: 84   Resp: 18 SpO2: 92 % O2 Device: None (Room air)      Pt A&Ox4. Pt is on RA while awake.Denies SOB. Up independently in room. BS active x4, pt has been NPO since midnight.  Pt reports abdominal pain, lower back pain and mild headache. Given PRN po Tylenol and IV Dilaudid w/ relief.  IV Zosyn Q6H. CIWA scores: 0, 0. GI and General Surgery consulted. LR infusing @ 100mL/hr. Blood cultures pending. Will continue to monitor and  provide supportive cares.

## 2021-11-27 NOTE — PLAN OF CARE
Leaving Against or Without Medical Advice document was reviewed with patient and/or spouse.   Patient verbalized understanding and was given an opportunity to ask questions. All personal belongings returned to patient. Patient exited building with spouse. IV access discontinued.

## 2021-11-27 NOTE — PROGRESS NOTES
[facility]  Gastroenterology Progress note      Assessment       Symptomatic cholelithiasis.  Rapid decline in transaminases would tend to support this and not a cbd stone.  Bilirubin higher today but other liver tests and wbc all improving.  I suspect bilirubin will be falling tomorrow.  MRCP does not indicate any CBD stones but initial LFT's are suggestive of this as is the bilirubin >3.  Discussed with surgery as well    Plan    Overall prefer to observe one additional day.  If labs improving tomorrow, could discharge with plans for EUS next week followed by elective cholecystectomy .  If not improving, consider ERCP.        Interval History     as above.  Patient is quite anxious to be discharged.      Physical Exam    BP (!) 144/83 (BP Location: Left arm)   Pulse 78   Temp 99  F (37.2  C) (Axillary)   Resp 18   Wt 122.6 kg (270 lb 3.2 oz)   SpO2 93%   BMI 35.65 kg/m    Temp (24hrs), Av.9  F (37.2  C), Min:98.5  F (36.9  C), Max:99.5  F (37.5  C)    Patient Vitals for the past 72 hrs:   Weight   21 0139 122.6 kg (270 lb 3.2 oz)       Intake/Output Summary (Last 24 hours) at 2021 1409  Last data filed at 2021 1000  Gross per 24 hour   Intake 60 ml   Output --   Net 60 ml         Constitutional: NAD, comfortable  Cardiovascular: RRR, normal S1, S2   Respiratory: CTAB  Abdomen: soft, non-tender, nondistended,  Bs+  Eyes:  Moderate scleral icterus      Additional Comments     ROS, FH, SH: See initial GI consult for details.    Lab Data     Recent Labs   Lab Test 213 21  0611   WBC 6.3 15.9* 7.4   HGB 13.0* 13.7 13.0*   MCV 99 96 95    216 152     Recent Labs   Lab Test 21  0611    135 142   POTASSIUM 3.4 3.7 4.0   CHLORIDE 100 100 112*   CO2 28 26 22   BUN 6* 14 21   CR 0.70 0.88 1.09   ANIONGAP 5 9 8   MEENAKSHI 9.0 9.1 7.7*     Recent Labs   Lab Test 21  0518 21  1901  06/07/21  0611 06/06/21  1907 06/06/21  0725   ALBUMIN 3.0* 3.8 3.2*   < >  --    BILITOTAL 5.2* 2.5* 0.3   < >  --    * 508* 31   < >  --    * 1,014* 24   < >  --    ALKPHOS 140 162* 64   < >  --    PROTEIN  --   --   --   --  Negative   LIPASE  --  365  --   --   --     < > = values in this interval not displayed.               RONALD Mims MD  [facility]  Office: [phone] call if needed after 5PM or on weekends  Cell: [phone], not available after 5PM at this number

## 2021-11-27 NOTE — DISCHARGE SUMMARY
[facility]    Discharge Summary  Hospitalist    Date of Admission:  11/25/2021  Date of Discharge:  AMA 11/27/2021  Discharging Provider: Mckayla Astorga PA-C  Date of Service (when I saw the patient): 11/27/21    Discharge Diagnoses   1. AMA discharge  2. Symptomatic Cholelithiasis   3. Incidental colonic wall thickening  4. Hepatic Steatosis     History of Present Illness   Elfego Pickens is a 61 year old male with PMHx of paroxysmal A-fib, HLD, and HTN who was admitted on 11/25/2021 with abdominal pain.     In the ED: Temperature 97.8, heart rate 110, blood pressure 152/78, pulse ox 93% on room air.  Laboratory work-up: Sodium 135, potassium 3.7, chloride 100, CO2 26 BUN 14, creatinine 0.88, glucose 162, T bili 2.5, alkaline phosphatase 162, , AST 1014.  Lipase 365.  Troponin negative.  WBC 15.9.  Hemoglobin 13.7.  Platelet count 216.  COVID-19/influenza negative. Imaging: RUQ US: cholelithiasis with mild gallbladder wall thickening. Hepatic steatosis. CT abdomen/pelvis: Cholelithiasis with inflammatory change. Diverticulosis without diverticulitis. Equivocal circumferential thickening of right colon wall - recommend colonoscopy to exclude mass. Inguinal hernia. CXR: Unchanged.      ED treated patient with IV zosyn, IVF, and famotidine. Discussed patient with GI - suspect patient will need ERCP. Attempted to transfer patient for ERCP since not available at [facility] on 11/27 - unable to transfer patient. Recommending MRCP with GI consultation in AM to reassess.     Please see admitting H & P  by Sandy Estrella MD on 11/25/2021 for full details of the encounter.     Hospital Course   Elfego Pickens was admitted on 11/25/2021.  He was found to have abnormal liver enzymes c/w obstructive LFT pattern. GI and General Surgery were consulted. Treated with IV antibiotics. No beds available to transfer for ERCP. MRCP pursued given increasing total bilirubin and did not indicated CBD stones.   "[facility] would not offer laparoscopic cholecystectomy until \"CBD cleared\".  GI recommended further observation for down trend in bilirubin, further assess timing of cholecystectomy. Patient discharged ama later in the day I filled antibiotics for him at the least recommend close outpatient follow up.       Patient has been educated on potential risks of choosing to leave the unit and the responsibility for patient well-being will belong to the patient. Pt has been informed that remaining in the hospital is due to need for medical treatment. Education given to the patient on some of the potential risks included but is not limited to:            lack of access to nursing and medical intervention            possible missed consults with MDs, therapies, tests            possible missed medications, antibiotics, management of IV's            Pending Results   These results will be followed up by hopsitalist  Unresulted Labs Ordered in the Past 30 Days of this Admission     Date and Time Order Name Status Description    11/26/2021  1:34 AM Blood Culture Hand, Left Preliminary     11/26/2021  1:34 AM Blood Culture Arm, Left Preliminary           Code Status   Full Code       Primary Care Physician   [facility]        /72 (BP Location: Left arm)   Pulse 85   Temp 99  F (37.2  C) (Axillary)   Resp 18   Wt 122.6 kg (270 lb 3.2 oz)   SpO2 94%   BMI 35.65 kg/m      Discharge Disposition   Discharged to home  Condition at discharge: Stable    Consultations This Hospital Stay   SURGERY GENERAL IP CONSULT  GASTROENTEROLOGY IP CONSULT    Time Spent on this Encounter   IMckayla PA-C, personally saw the patient today and spent greater than 30 minutes discharging this patient.    Discharge Orders      General Surg Adult Referral      Reason for your hospital stay    You were admitted for     Follow-up and recommended labs and tests     1. Follow up with primary care provider, [facility], within 7 days for hospital follow- up.  The following labs/tests are recommended: liver function panel, cbc.  2. GI for colonoscopy  3. General surgery for gall bladder removal     Activity    Your activity upon discharge: activity as tolerated     Diet    Follow this diet upon discharge: advance to a low fat diet as tolerated     Discharge Medications   Current Discharge Medication List      START taking these medications    Details   amoxicillin-clavulanate (AUGMENTIN) 875-125 MG tablet Take 1 tablet by mouth 2 times daily for 7 days  Qty: 14 tablet, Refills: 0    Associated Diagnoses: Acute cholecystitis      ondansetron (ZOFRAN-ODT) 4 MG ODT tab Take 1 tablet (4 mg) by mouth every 6 hours as needed for nausea or vomiting  Qty: 18 tablet, Refills: 0    Associated Diagnoses: Acute cholecystitis         CONTINUE these medications which have CHANGED    Details   amLODIPine (NORVASC) 10 MG tablet Take 1 tablet (10 mg) by mouth daily    Associated Diagnoses: Hypertension, unspecified type         CONTINUE these medications which have NOT CHANGED    Details   Ascorbic Acid (VITAMIN C PO) Take 1 tablet by mouth daily      B Complex Vitamins (B COMPLEX PO) Take 1 tablet by mouth daily      cyclobenzaprine (FLEXERIL) 10 MG tablet Take 10 mg by mouth 3 times daily as needed for muscle spasms      diphenhydrAMINE (BENADRYL) 25 MG capsule Take 25 mg by mouth daily as needed for itching or allergies      DULoxetine HCl 40 MG CPEP Take 1 capsule by mouth daily      gabapentin (NEURONTIN) 300 MG capsule Take 900 mg by mouth 3 times daily      lisinopril-hydrochlorothiazide (ZESTORETIC) 20-25 MG tablet Take 1 tablet by mouth daily      metoprolol succinate ER (TOPROL-XL) 100 MG 24 hr tablet Take 150 mg by mouth daily      omega 3 1000 MG CAPS Take 1 capsule by mouth daily      simvastatin (ZOCOR) 20 MG tablet Take 20 mg by mouth At Bedtime      VITAMIN D PO Take 1 tablet by mouth daily      ZINC SULFATE PO Take 1  tablet by mouth daily      sildenafil (REVATIO) 20 MG tablet Take  mg by mouth once as needed (30-60 minutes prior to sexual intercourse)           Allergies   No Known Allergies  Data   Most Recent 3 CBC's:Recent Labs   Lab Test 11/27/21 0518 11/25/21 1903 06/07/21 0611   WBC 6.3 15.9* 7.4   HGB 13.0* 13.7 13.0*   MCV 99 96 95    216 152      Most Recent 3 BMP's:  Recent Labs   Lab Test 11/27/21 0518 11/25/21 1904 06/07/21 0611    135 142   POTASSIUM 3.4 3.7 4.0   CHLORIDE 100 100 112*   CO2 28 26 22   BUN 6* 14 21   CR 0.70 0.88 1.09   ANIONGAP 5 9 8   MEENAKSHI 9.0 9.1 7.7*   * 162* 116*     Most Recent 2 LFT's:  Recent Labs   Lab Test 11/27/21 0518 11/25/21 1904   * 1,014*   * 508*   ALKPHOS 140 162*   BILITOTAL 5.2* 2.5*     Most Recent INR's and Anticoagulation Dosing History:  Anticoagulation Dose History    There is no flowsheet data to display.       Most Recent 3 Troponin's:  Recent Labs   Lab Test 11/25/21 1904 06/06/21 1907   TROPI  --  <0.015   TROPONIN <0.015  --      Most Recent Cholesterol Panel:No lab results found.  Most Recent 6 Bacteria Isolates From Any Culture (See EPIC Reports for Culture Details):  Recent Labs   Lab Test 06/06/21 2035 06/06/21 2027   CULT No growth No growth     Most Recent TSH, T4 and A1c Labs:No lab results found.  Results for orders placed or performed during the hospital encounter of 11/25/21   CT Abdomen Pelvis w Contrast    Narrative    EXAM: CT ABDOMEN PELVIS W CONTRAST  LOCATION: [facility]  DATE/TIME: 11/25/2021 8:41 PM    INDICATION: Epigastric abdominal pain with nausea and vomiting  COMPARISON: 6/6/2021  TECHNIQUE: CT scan of the abdomen and pelvis was performed following injection of IV contrast. Multiplanar reformats were obtained. Dose reduction techniques were used.  CONTRAST: 100 mL Isovue-370    FINDINGS:   LOWER CHEST: Mild fibroatelectasis. No focal infiltrate or  effusion.    HEPATOBILIARY: Gallstones in the gallbladder neck with no adjacent inflammatory change. Liver normal.    PANCREAS: Normal.    SPLEEN: Normal.    ADRENAL GLANDS: Normal.    KIDNEYS/BLADDER: Small left renal parapelvic and cortical cysts requiring no further evaluation, otherwise normal.    BOWEL: Diverticulosis without diverticulitis. Equivocal circumferential colonic wall thickening involving ascending colon could be artifact from incomplete distention. No bowel obstruction. No acute inflammatory process involves the GI tract.    LYMPH NODES: Normal.    VASCULATURE: Unremarkable.    PELVIC ORGANS: Normal.    MUSCULOSKELETAL: Small fat-containing left inguinal hernia. Degenerative disease.      Impression    IMPRESSION:   1.  Cholelithiasis without adjacent inflammatory change.  2.  Diverticulosis without diverticulitis. Equivocal circumferential thickening of right colon wall. If not performed recently, GI consult recommended to consider colonoscopy to exclude underlying mass, although this could be artifact from incomplete   distention.  3.  Small fat-containing left inguinal hernia.    NOTE: ABNORMAL REPORT    THE DICTATION ABOVE DESCRIBES AN ABNORMALITY FOR WHICH FOLLOW-UP IS NEEDED.    XR Chest 2 Views    Narrative    EXAM: XR CHEST 2 VW  LOCATION: [facility]  DATE/TIME: 11/25/2021 8:50 PM    INDICATION: cough, recent pna  COMPARISON: 06/06/2021      Impression    IMPRESSION: No change. Few tiny calcified granuloma present left mid lung. Lungs otherwise clear. Heart size and pulmonary vessels are normal.   US Abdomen Limited (RUQ)    Narrative    EXAM: US ABDOMEN LIMITED  LOCATION: [facility]  DATE/TIME: 11/25/2021 10:29 PM    INDICATION: Abdominal pain with gallstones noted on CT  COMPARISON: CT earlier today  TECHNIQUE: Limited abdominal ultrasound.    FINDINGS:    GALLBLADDER: Cholelithiasis. Wall mildly thickened to 4 mm. Negative sonographic  Casiano's.    BILE DUCTS: No biliary dilatation. The common duct is obscured from bowel gas however it was normal on CT.    LIVER: Hepatic steatosis. No focal mass.    RIGHT KIDNEY: No hydronephrosis.    PANCREAS: Completely obscured from bowel gas.    No ascites.      Impression    IMPRESSION:  1.  Cholelithiasis with mild gallbladder wall thickening. Negative sonographic Casiano's.  2.  Hepatic steatosis.       MR Abdomen MRCP w/o & w Contrast    Narrative    EXAM: MR ABDOMEN MRCP W/O and W CONTRAST  LOCATION: [facility]  DATE/TIME: 11/26/2021 6:45 PM    INDICATION: Cholecystitis (Ped 0-18y)  COMPARISON: 11/25/2021 CT and ultrasound.  TECHNIQUE: Routine MR liver/pancreas protocol including axial and coronal MRCP sequences. 2D and 3D reconstruction performed by MR technologist including MIP reconstruction and slab cholangiograms. If performed with contrast, additional dynamic T1 post   IV contrast images.  CONTRAST: 10 mL Gadavist.     FINDINGS:     MRCP: Gallstones persist within gallbladder neck but there is no inflammatory gallbladder wall thickening or pericholecystic inflammation. Bile ducts are normal with CBD measuring 5 mm. No intrahepatic bile duct stone evident.    LIVER: Mild fatty infiltration of liver. No focal lesion evident.    PANCREAS: Normal.    ADDITIONAL FINDINGS: Small renal cysts.      Impression    IMPRESSION:  1.  Cholelithiasis.  2.  No inflammatory changes of gallbladder wall.  3.  Bile duct normal, no intraductal stones evident.       Mckayla Astorga PA-C  [facility]

## 2021-11-29 ENCOUNTER — NURSE TRIAGE (OUTPATIENT)
Dept: NURSING | Facility: CLINIC | Age: 61
End: 2021-11-29
Payer: COMMERCIAL

## 2021-11-29 ENCOUNTER — TELEPHONE (OUTPATIENT)
Dept: SURGERY | Facility: CLINIC | Age: 61
End: 2021-11-29
Payer: COMMERCIAL

## 2021-11-29 DIAGNOSIS — Z11.59 ENCOUNTER FOR SCREENING FOR OTHER VIRAL DISEASES: ICD-10-CM

## 2021-11-29 NOTE — TELEPHONE ENCOUNTER
Type of surgery: LAPAROSCOPIC CHOLECYSTECTOMY WITH CHOLANGIOGRAMS   Location of surgery: [facility]  Date and time of surgery: 12-7-21, 1:50 PM   Surgeon: DR. GUERRERO 3  Pre-Op Appt Date: 11/25/21   Post-Op Appt Date: NA   Packet sent out: Yes  Pre-cert/Authorization completed:  Not Applicable  Date: 11-29-21       LAPAROSCOPIC CHOLECYSTECTOMY WITH CHOLANGIOGRAMS   GENERAL   H&P DONE [facility] ER 11/25   PA ASSIST NLG   ALW

## 2021-11-29 NOTE — LETTER
"          [facility]    [facility], [address]  [place], [place] [address]  Phone [phone]  Fax [phone]    [address], [address]  [facility] [place] [address]  Phone [phone]  Fax [phone]    [email or web address]           Elfego Pickens     You have been scheduled for a COVID-19 testing appointment at [facility].    12-3-21 at 10:30 AM        [facility]:  [address] [place]  [address]    Park in the Emergency Department parking lot and enter building through the Emergency Department entrance.  Watch for the \"Covid-19 Collection Lab\" sign inside the entry way and press the doorbell on the sign. This will open the doors on the right side into the Covid Collection Lab.  All patients must have a mask upon entry into the hospital.  Please do not arrive prior to your appointment time to ensure proper social distancing.    Please wear a mask or face covering to the testing site.      Following your testing, you will be required to self-isolate until your surgery.  If you need a note for your employer due to this, please let us know.      "

## 2021-11-29 NOTE — TELEPHONE ENCOUNTER
Additional Information    Information only question and nurse able to answer    Protocols used: INFORMATION ONLY CALL - NO TRIAGE-A-OH     - With visually significant PCO OS

## 2021-11-29 NOTE — LETTER
[facility]    [address], [address]  [place], [place] [address]  Phone [phone]  Fax [phone] [address], [address]  [facility] [place] [address]  Phone [phone]  Fax [phone]    [email or web address]   November 29, 2021    Elfego Pickens  [address] [place] [address]    We realize with scheduling surgery, one of your first questions is, how much will this cost?  Below we have provided you with the information you will need to receive an estimate for your surgery.    You are scheduled for the following procedure:  LAPAROSCOPIC CHOLECYSTECTOMY WITH CHOLANGIOGRMAS      Surgeon:  DR. GUERRERO     Physician Assistant:  Yes      Please make sure to have your insurance card available at the time of calling.    Surgeon & Physician Assistant charges and facility charges for [facility], [facility] or [facility]:    Consumer Price Line at [phone]   -  It is important to note that there may be a Physician Assistant assisting with your surgery.  Please be sure to mention this when calling for the estimate.      If you prefer, you can also request a price estimate online by completing the secure form at:  [email or web address]    Facility Charges at [facility], [facility] or [facility]:  [facility] at [phone]  [facility] at [phone]  [facility] at [phone] or [phone]    Anesthesiologist Charges:  [facility] at [phone]    [facility] - Nurse Anesthetist Charges:  [facility] at [phone]

## 2021-11-29 NOTE — TELEPHONE ENCOUNTER
Patient was admitted to hospital Friday night for stomach pains, but later discharged as surgery could not be done until a later date.    Patient calling today with questions about follow-up labs to move forward with surgery. He was told to go to [facility] Mon am for lab work, but did not have orders in his chart.    RN/Writer advised patient to follow-up with his primary physician at [facility] per recommendations from hospital discharge orders as stated below per Mckayla Astorga PA-C on 11/27/21:    Follow-up and recommended labs and tests      1. Follow up with primary care provider, [facility], within 7 days for hospital follow- up.  The following labs/tests are recommended: liver function panel, cbc.  2. GI for colonoscopy  3. General surgery for gall bladder removal     Patient verbalized understanding and agreed with plan of care.    Kenyetta Yip RN  11/29/21 8:52 AM  [facility] Nurse Advisor    COVID 19 Nurse Triage Plan/Patient Instructions    Please be aware that novel coronavirus (COVID-19) may be circulating in the community. If you develop symptoms such as fever, cough, or SOB or if you have concerns about the presence of another infection including coronavirus (COVID-19), please contact your health care provider or visit [email or web address].     Disposition/Instructions    Home care recommended. Follow home care protocol based instructions.    Thank you for taking steps to prevent the spread of this virus.  o Limit your contact with others.  o Wear a simple mask to cover your cough.  o Wash your hands well and often.    Resources    [facility]: About COVID-19: [email or web address]    CDC: What to Do If You're Sick: www.cdc.gov/coronavirus/2019-ncov/about/steps-when-sick.html    CDC: Ending Home Isolation: www.cdc.gov/coronavirus/2019-ncov/hcp/disposition-in-home-patients.html     CDC: Caring for Someone:  www.cdc.gov/coronavirus/2019-ncov/if-you-are-sick/care-for-someone.html     [facility]: Interim Guidance for Hospital Discharge to Home: www.health.[facility].[place]/diseases/coronavirus/hcp/hospdischarge.pdf    [facility] clinical trials (COVID-19 research studies): [email or web address]-clinical-trials     Below are the COVID-19 hotlines at the [place] [facility] ([facility]). Interpreters are available.   o For health questions: Call 753-886-6657 or [phone] (7 a.m. to 7 p.m.)  o For questions about schools and childcare: Call 314-406-8749 or [phone] (7 a.m. to 7 p.m.)

## 2021-12-01 LAB
BACTERIA BLD CULT: NO GROWTH
BACTERIA BLD CULT: NO GROWTH

## 2021-12-03 ENCOUNTER — LAB (OUTPATIENT)
Dept: LAB | Facility: CLINIC | Age: 61
End: 2021-12-03
Payer: COMMERCIAL

## 2021-12-03 DIAGNOSIS — Z11.59 ENCOUNTER FOR SCREENING FOR OTHER VIRAL DISEASES: ICD-10-CM

## 2021-12-03 PROCEDURE — U0003 INFECTIOUS AGENT DETECTION BY NUCLEIC ACID (DNA OR RNA); SEVERE ACUTE RESPIRATORY SYNDROME CORONAVIRUS 2 (SARS-COV-2) (CORONAVIRUS DISEASE [COVID-19]), AMPLIFIED PROBE TECHNIQUE, MAKING USE OF HIGH THROUGHPUT TECHNOLOGIES AS DESCRIBED BY CMS-2020-01-R: HCPCS

## 2021-12-03 ASSESSMENT — MIFFLIN-ST. JEOR: SCORE: 1987.87

## 2021-12-04 LAB — SARS-COV-2 RNA RESP QL NAA+PROBE: NEGATIVE

## 2021-12-07 ENCOUNTER — APPOINTMENT (OUTPATIENT)
Dept: SURGERY | Facility: PHYSICIAN GROUP | Age: 61
End: 2021-12-07
Payer: COMMERCIAL

## 2021-12-07 ENCOUNTER — ANESTHESIA (OUTPATIENT)
Dept: SURGERY | Facility: CLINIC | Age: 61
End: 2021-12-07
Payer: COMMERCIAL

## 2021-12-07 ENCOUNTER — ANESTHESIA EVENT (OUTPATIENT)
Dept: SURGERY | Facility: CLINIC | Age: 61
End: 2021-12-07
Payer: COMMERCIAL

## 2021-12-07 ENCOUNTER — HOSPITAL ENCOUNTER (OUTPATIENT)
Facility: CLINIC | Age: 61
Discharge: HOME | End: 2021-12-07
Attending: SURGERY | Admitting: SURGERY
Payer: COMMERCIAL

## 2021-12-07 ENCOUNTER — APPOINTMENT (OUTPATIENT)
Dept: GENERAL RADIOLOGY | Facility: CLINIC | Age: 61
End: 2021-12-07
Attending: SURGERY
Payer: COMMERCIAL

## 2021-12-07 VITALS
DIASTOLIC BLOOD PRESSURE: 70 MMHG | RESPIRATION RATE: 16 BRPM | WEIGHT: 250 LBS | SYSTOLIC BLOOD PRESSURE: 137 MMHG | HEIGHT: 73 IN | TEMPERATURE: 97 F | HEART RATE: 60 BPM | OXYGEN SATURATION: 95 % | BODY MASS INDEX: 33.13 KG/M2

## 2021-12-07 DIAGNOSIS — K83.1 BILIARY OBSTRUCTION (H): Primary | ICD-10-CM

## 2021-12-07 PROCEDURE — 710N000012 HC RECOVERY PHASE 2, PER MINUTE: Performed by: SURGERY

## 2021-12-07 PROCEDURE — 88304 TISSUE EXAM BY PATHOLOGIST: CPT | Mod: 26 | Performed by: PATHOLOGY

## 2021-12-07 PROCEDURE — 47563 LAPARO CHOLECYSTECTOMY/GRAPH: CPT | Performed by: SURGERY

## 2021-12-07 PROCEDURE — 250N000009 HC RX 250: Performed by: NURSE ANESTHETIST, CERTIFIED REGISTERED

## 2021-12-07 PROCEDURE — 360N000083 HC SURGERY LEVEL 3 W/ FLUORO, PER MIN: Performed by: SURGERY

## 2021-12-07 PROCEDURE — 999N000141 HC STATISTIC PRE-PROCEDURE NURSING ASSESSMENT: Performed by: SURGERY

## 2021-12-07 PROCEDURE — 250N000011 HC RX IP 250 OP 636: Performed by: NURSE ANESTHETIST, CERTIFIED REGISTERED

## 2021-12-07 PROCEDURE — 710N000009 HC RECOVERY PHASE 1, LEVEL 1, PER MIN: Performed by: SURGERY

## 2021-12-07 PROCEDURE — 272N000001 HC OR GENERAL SUPPLY STERILE: Performed by: SURGERY

## 2021-12-07 PROCEDURE — 258N000003 HC RX IP 258 OP 636: Performed by: ANESTHESIOLOGY

## 2021-12-07 PROCEDURE — 250N000011 HC RX IP 250 OP 636: Performed by: ANESTHESIOLOGY

## 2021-12-07 PROCEDURE — 88304 TISSUE EXAM BY PATHOLOGIST: CPT | Mod: TC | Performed by: SURGERY

## 2021-12-07 PROCEDURE — 999N000179 XR SURGERY CARM FLUORO LESS THAN 5 MIN W STILLS

## 2021-12-07 PROCEDURE — 47563 LAPARO CHOLECYSTECTOMY/GRAPH: CPT | Mod: AS | Performed by: PHYSICIAN ASSISTANT

## 2021-12-07 PROCEDURE — 370N000017 HC ANESTHESIA TECHNICAL FEE, PER MIN: Performed by: SURGERY

## 2021-12-07 PROCEDURE — 250N000009 HC RX 250: Performed by: SURGERY

## 2021-12-07 PROCEDURE — 250N000011 HC RX IP 250 OP 636: Performed by: SURGERY

## 2021-12-07 PROCEDURE — 255N000002 HC RX 255 OP 636: Performed by: SURGERY

## 2021-12-07 PROCEDURE — 250N000013 HC RX MED GY IP 250 OP 250 PS 637: Performed by: SURGERY

## 2021-12-07 RX ORDER — PHENYLEPHRINE HYDROCHLORIDE 10 MG/ML
INJECTION INTRAVENOUS PRN
Status: DISCONTINUED | OUTPATIENT
Start: 2021-12-07 | End: 2021-12-07

## 2021-12-07 RX ORDER — FENTANYL CITRATE 50 UG/ML
25 INJECTION, SOLUTION INTRAMUSCULAR; INTRAVENOUS EVERY 5 MIN PRN
Status: DISCONTINUED | OUTPATIENT
Start: 2021-12-07 | End: 2021-12-07 | Stop reason: HOSPADM

## 2021-12-07 RX ORDER — ONDANSETRON 2 MG/ML
INJECTION INTRAMUSCULAR; INTRAVENOUS PRN
Status: DISCONTINUED | OUTPATIENT
Start: 2021-12-07 | End: 2021-12-07

## 2021-12-07 RX ORDER — LIDOCAINE 40 MG/G
CREAM TOPICAL
Status: DISCONTINUED | OUTPATIENT
Start: 2021-12-07 | End: 2021-12-07 | Stop reason: HOSPADM

## 2021-12-07 RX ORDER — FENTANYL CITRATE 50 UG/ML
25 INJECTION, SOLUTION INTRAMUSCULAR; INTRAVENOUS
Status: DISCONTINUED | OUTPATIENT
Start: 2021-12-07 | End: 2021-12-07 | Stop reason: HOSPADM

## 2021-12-07 RX ORDER — PROPOFOL 10 MG/ML
INJECTION, EMULSION INTRAVENOUS PRN
Status: DISCONTINUED | OUTPATIENT
Start: 2021-12-07 | End: 2021-12-07

## 2021-12-07 RX ORDER — HYDROMORPHONE HCL IN WATER/PF 6 MG/30 ML
0.2 PATIENT CONTROLLED ANALGESIA SYRINGE INTRAVENOUS EVERY 5 MIN PRN
Status: DISCONTINUED | OUTPATIENT
Start: 2021-12-07 | End: 2021-12-07 | Stop reason: HOSPADM

## 2021-12-07 RX ORDER — ONDANSETRON 2 MG/ML
4 INJECTION INTRAMUSCULAR; INTRAVENOUS EVERY 30 MIN PRN
Status: DISCONTINUED | OUTPATIENT
Start: 2021-12-07 | End: 2021-12-07 | Stop reason: HOSPADM

## 2021-12-07 RX ORDER — GLYCOPYRROLATE 0.2 MG/ML
INJECTION, SOLUTION INTRAMUSCULAR; INTRAVENOUS PRN
Status: DISCONTINUED | OUTPATIENT
Start: 2021-12-07 | End: 2021-12-07

## 2021-12-07 RX ORDER — KETOROLAC TROMETHAMINE 30 MG/ML
INJECTION, SOLUTION INTRAMUSCULAR; INTRAVENOUS PRN
Status: DISCONTINUED | OUTPATIENT
Start: 2021-12-07 | End: 2021-12-07

## 2021-12-07 RX ORDER — OXYCODONE HYDROCHLORIDE 5 MG/1
5-10 TABLET ORAL EVERY 4 HOURS PRN
Qty: 16 TABLET | Refills: 0 | Status: SHIPPED | OUTPATIENT
Start: 2021-12-07

## 2021-12-07 RX ORDER — MEPERIDINE HYDROCHLORIDE 25 MG/ML
12.5 INJECTION INTRAMUSCULAR; INTRAVENOUS; SUBCUTANEOUS
Status: DISCONTINUED | OUTPATIENT
Start: 2021-12-07 | End: 2021-12-07 | Stop reason: HOSPADM

## 2021-12-07 RX ORDER — SODIUM CHLORIDE, SODIUM LACTATE, POTASSIUM CHLORIDE, CALCIUM CHLORIDE 600; 310; 30; 20 MG/100ML; MG/100ML; MG/100ML; MG/100ML
INJECTION, SOLUTION INTRAVENOUS CONTINUOUS
Status: DISCONTINUED | OUTPATIENT
Start: 2021-12-07 | End: 2021-12-07 | Stop reason: HOSPADM

## 2021-12-07 RX ORDER — ONDANSETRON 4 MG/1
4 TABLET, ORALLY DISINTEGRATING ORAL EVERY 30 MIN PRN
Status: DISCONTINUED | OUTPATIENT
Start: 2021-12-07 | End: 2021-12-07 | Stop reason: HOSPADM

## 2021-12-07 RX ORDER — DEXAMETHASONE SODIUM PHOSPHATE 4 MG/ML
INJECTION, SOLUTION INTRA-ARTICULAR; INTRALESIONAL; INTRAMUSCULAR; INTRAVENOUS; SOFT TISSUE PRN
Status: DISCONTINUED | OUTPATIENT
Start: 2021-12-07 | End: 2021-12-07

## 2021-12-07 RX ORDER — OXYCODONE AND ACETAMINOPHEN 5; 325 MG/1; MG/1
2 TABLET ORAL
Status: COMPLETED | OUTPATIENT
Start: 2021-12-07 | End: 2021-12-07

## 2021-12-07 RX ORDER — NEOSTIGMINE METHYLSULFATE 1 MG/ML
VIAL (ML) INJECTION PRN
Status: DISCONTINUED | OUTPATIENT
Start: 2021-12-07 | End: 2021-12-07

## 2021-12-07 RX ORDER — CEFAZOLIN SODIUM IN 0.9 % NACL 3 G/100 ML
3 INTRAVENOUS SOLUTION, PIGGYBACK (ML) INTRAVENOUS SEE ADMIN INSTRUCTIONS
Status: DISCONTINUED | OUTPATIENT
Start: 2021-12-07 | End: 2021-12-07 | Stop reason: HOSPADM

## 2021-12-07 RX ORDER — CEFAZOLIN SODIUM IN 0.9 % NACL 3 G/100 ML
3 INTRAVENOUS SOLUTION, PIGGYBACK (ML) INTRAVENOUS
Status: COMPLETED | OUTPATIENT
Start: 2021-12-07 | End: 2021-12-07

## 2021-12-07 RX ORDER — FENTANYL CITRATE 50 UG/ML
INJECTION, SOLUTION INTRAMUSCULAR; INTRAVENOUS PRN
Status: DISCONTINUED | OUTPATIENT
Start: 2021-12-07 | End: 2021-12-07

## 2021-12-07 RX ORDER — AMOXICILLIN 250 MG
1-2 CAPSULE ORAL 2 TIMES DAILY
Qty: 30 TABLET | Refills: 0 | Status: SHIPPED | OUTPATIENT
Start: 2021-12-07

## 2021-12-07 RX ORDER — ONDANSETRON 4 MG/1
4 TABLET, ORALLY DISINTEGRATING ORAL
Status: DISCONTINUED | OUTPATIENT
Start: 2021-12-07 | End: 2021-12-07 | Stop reason: HOSPADM

## 2021-12-07 RX ORDER — OXYCODONE HYDROCHLORIDE 5 MG/1
5 TABLET ORAL EVERY 4 HOURS PRN
Status: DISCONTINUED | OUTPATIENT
Start: 2021-12-07 | End: 2021-12-07 | Stop reason: HOSPADM

## 2021-12-07 RX ORDER — BUPIVACAINE HYDROCHLORIDE 2.5 MG/ML
INJECTION, SOLUTION EPIDURAL; INFILTRATION; INTRACAUDAL PRN
Status: DISCONTINUED | OUTPATIENT
Start: 2021-12-07 | End: 2021-12-07 | Stop reason: HOSPADM

## 2021-12-07 RX ORDER — LIDOCAINE HYDROCHLORIDE 10 MG/ML
INJECTION, SOLUTION INFILTRATION; PERINEURAL PRN
Status: DISCONTINUED | OUTPATIENT
Start: 2021-12-07 | End: 2021-12-07

## 2021-12-07 RX ADMIN — OXYCODONE HYDROCHLORIDE AND ACETAMINOPHEN 1 TABLET: 5; 325 TABLET ORAL at 16:21

## 2021-12-07 RX ADMIN — ROCURONIUM BROMIDE 50 MG: 50 INJECTION, SOLUTION INTRAVENOUS at 14:54

## 2021-12-07 RX ADMIN — SODIUM CHLORIDE, POTASSIUM CHLORIDE, SODIUM LACTATE AND CALCIUM CHLORIDE: 600; 310; 30; 20 INJECTION, SOLUTION INTRAVENOUS at 14:44

## 2021-12-07 RX ADMIN — ONDANSETRON HYDROCHLORIDE 4 MG: 2 INJECTION, SOLUTION INTRAVENOUS at 14:54

## 2021-12-07 RX ADMIN — NEOSTIGMINE METHYLSULFATE 5 MG: 1 INJECTION, SOLUTION INTRAVENOUS at 15:39

## 2021-12-07 RX ADMIN — FENTANYL CITRATE 25 MCG: 50 INJECTION INTRAMUSCULAR; INTRAVENOUS at 16:07

## 2021-12-07 RX ADMIN — FENTANYL CITRATE 100 MCG: 50 INJECTION, SOLUTION INTRAMUSCULAR; INTRAVENOUS at 14:52

## 2021-12-07 RX ADMIN — GLYCOPYRROLATE 0.2 MG: 0.2 INJECTION, SOLUTION INTRAMUSCULAR; INTRAVENOUS at 14:54

## 2021-12-07 RX ADMIN — SODIUM CHLORIDE, POTASSIUM CHLORIDE, SODIUM LACTATE AND CALCIUM CHLORIDE: 600; 310; 30; 20 INJECTION, SOLUTION INTRAVENOUS at 14:35

## 2021-12-07 RX ADMIN — MIDAZOLAM 2 MG: 1 INJECTION INTRAMUSCULAR; INTRAVENOUS at 14:47

## 2021-12-07 RX ADMIN — Medication 3 G: at 14:31

## 2021-12-07 RX ADMIN — LIDOCAINE HYDROCHLORIDE 50 MG: 10 INJECTION, SOLUTION INFILTRATION; PERINEURAL at 14:54

## 2021-12-07 RX ADMIN — PHENYLEPHRINE HYDROCHLORIDE 100 MCG: 10 INJECTION INTRAVENOUS at 15:30

## 2021-12-07 RX ADMIN — FENTANYL CITRATE 50 MCG: 50 INJECTION, SOLUTION INTRAMUSCULAR; INTRAVENOUS at 14:54

## 2021-12-07 RX ADMIN — KETOROLAC TROMETHAMINE 30 MG: 30 INJECTION, SOLUTION INTRAMUSCULAR at 14:54

## 2021-12-07 RX ADMIN — FENTANYL CITRATE 100 MCG: 50 INJECTION, SOLUTION INTRAMUSCULAR; INTRAVENOUS at 14:50

## 2021-12-07 RX ADMIN — DEXAMETHASONE SODIUM PHOSPHATE 8 MG: 4 INJECTION, SOLUTION INTRA-ARTICULAR; INTRALESIONAL; INTRAMUSCULAR; INTRAVENOUS; SOFT TISSUE at 14:54

## 2021-12-07 RX ADMIN — PROPOFOL 200 MG: 10 INJECTION, EMULSION INTRAVENOUS at 14:54

## 2021-12-07 RX ADMIN — PHENYLEPHRINE HYDROCHLORIDE 100 MCG: 10 INJECTION INTRAVENOUS at 15:14

## 2021-12-07 RX ADMIN — GLYCOPYRROLATE 0.8 MG: 0.2 INJECTION, SOLUTION INTRAMUSCULAR; INTRAVENOUS at 15:39

## 2021-12-07 ASSESSMENT — ENCOUNTER SYMPTOMS: DYSRHYTHMIAS: 1

## 2021-12-07 ASSESSMENT — MIFFLIN-ST. JEOR: SCORE: 1987.87

## 2021-12-07 NOTE — DISCHARGE INSTRUCTIONS
HOME CARE FOLLOWING LAPAROSCOPIC CHOLECYSTECTOMY  CRISTINA Lora    INCISIONAL CARE:  Replace the bandage over your incisions DAILY until all drainage stops, or if more comfortable to have in place.  If present, leave the steri-strips (white paper tapes) in place for 14 days after surgery.  If Dermabond (a type of skin glue) is present, leave in place until it wears/flakes off (2-3 weeks).     BATHING:  OK to shower 48 hours after surgery.  Avoid baths for 1 week after surgery.  You may wash your hair at any time.  Gently pat your incision dry after bathing.  Do not apply lotions, creams, or ointments to incisions.    ACTIVITY:  Light Activity -- you may immediately be up and about as tolerated.  Walking is encouraged, increase as tolerated.  Driving/Light Work-- when comfortable and off narcotic pain medications.  Strenuous Work/Activity -- limit lifting to 20 pounds for 2 weeks.  Progressively increase with time.  Active Sports (running, biking, etc.) -- cautiously resume after 2 weeks.    DISCOMFORT:  Local anesthetic placed at surgery should provide relief for 4-8 hours.  Begin taking pain pills before discomfort is severe.  Take the pain medication with some food, when possible, to minimize side effects.  Intermittent use of ice packs may help during the first 1-3 weeks after surgery.  Expect gradual improvement.    Over-the-counter anti-inflammatory medications (i.e. Ibuprofen/Advil/Motrin or Naprosyn/Aleve) may be used per package instructions in addition to or while tapering off the narcotic pain medications to decrease swelling and sensitivity.  DO NOT TAKE these Anti-inflammatory medications if your primary physician has advised against doing so, or if you have acid reflux, ulcer, or bleeding disorder, or take blood-thinner medications.  Call your primary physician or the surgery office if you have medication questions.    After laparoscopic cholecystectomy, you may have shoulder or upper back discomfort  due to the gas used during surgery.  This is temporary and should resolve within 2-3 days.  Frequent short walks may help with this.  You may have decreased energy level for 1-2 weeks after surgery related to your recovery.    DIET:  Start with liquids and gradually increase diet as tolerated.  Drink plenty of fluids.  While taking pain medications, consider use of a stool softener, increase your fiber in your diet, or add a fiber supplement (like Metamucil, Citrucel) to help prevent constipation - a possible side effect of pain medications.  It is not uncommon to experience some bowel changes (loose stools or constipation) after surgery.  Your body has to adapt to you no longer having a gall bladder.  To help minimize this side effect, avoid fatty foods for 1-2 weeks after surgery.  You may then slowly increase the amount of fatty foods in your diet.      NAUSEA:  If nauseated from the anesthetic/pain meds; rest in bed, get up cautiously with assistance, and drink clear liquids (juice, tea, broth).    FOLLOW-UP AFTER SURGERY:  -Our office will contact you approximately 2-3 weeks after surgery to check on your progress and answer any questions you may have.  If you are doing well, you will not need to return for an office appointment.  If any concerns are identified over the phone, we will help you make an appointment to see a provider.    -If you have not received a phone call, have any questions or concerns, or would like to be seen, please call us at [phone].  We are located at: [address], [address]; [place] [address]    -CONTACT US IF THE FOLLOWING DEVELOPS:   1. A fever that is above 101     2. Increased redness, warmth, drainage, bleeding, or swelling.   3. Pain that is not relieved by rest/ice and your prescription.   4.  Increasing pain after 48 hours.   5. Drainage that is thick, cloudy, yellow, green or white.   6. Any other questions or concerns.      FREQUENTLY ASKED QUESTIONS:    Q:   How should my incision look?    A:  Normally your incision will appear slightly swollen with light redness directly along the incision itself as it heals.  It may feel like a bump or ridge as the healing/scarring happens, and over time (3-4 months) this bump or ridge feeling should slowly go away.  In general, clear or pink watery drainage can be normal at first as your incision heals, but should decrease over time.    Q:  How do I know if my incision is infected?  A:  Look at your incision for signs of infection, like redness around the incision spreading to surrounding skin, or drainage of cloudy or foul-smelling drainage.  If you feel warm, check your temperature to see if you are running a fever.    **If any of these things occur, please notify the nurse at our office.  We may need you to come into the office for an incision check.      Q:  How do I take care of my incision?  A:  If you have a dressing in place - Starting the day after surgery, replace the dressing 1-2 times a day until there is no further drainage from the incision.  At that time, a dressing is no longer needed.  Try to minimize tape on the skin if irritation is occurring at the tape sites.  If you have significant irritation from tape on the skin, please call the office to discuss other method of dressing your incision.    Small pieces of tape called  steri-strips  may be present directly overlying your incision; these may be removed 10 days after surgery unless otherwise specified by your surgeon.  If these tapes start to loosen at the ends, you may trim them back until they fall off or are removed.    A:  If you had  Dermabond  tissue glue used as a dressing (this causes your incision to look shiny with a clear covering over it) - This type of dressing wears off with time and does not require more dressings over the top unless it is draining around the glue as it wears off.  Do not apply ointments or lotions over the incisions until the glue  has completely worn off.    Q:  There is a piece of tape or a sticky  lead  still on my skin.  Can I remove this?  A:  Sometimes the sticky  leads  used for monitoring during surgery or for evaluation in the emergency department are not all removed while you are in the hospital.  These sometimes have a tab or metal dot on them.  You can easily remove these on your own, like taking off a band-aid.  If there is a gel substance under the  lead , simply wipe/clean it off with a washcloth or paper towel.      Q:  What can I do to minimize constipation (very hard stools, or lack of stools)?  A:  Stay well hydrated.  Increase your dietary fiber intake or take a fiber supplement -with plenty of water.  Walk around frequently.  You may consider an over-the-counter stool-softener.  Your Pharmacist can assist you with choosing one that is stocked at your pharmacy.  Constipation is also one of the most common side effects of pain medication.  If you are using pain medication, be pro-active and try to PREVENT problems with constipation by taking the steps above BEFORE constipation becomes a problem.    Q:  What do I do if I need more pain medications?  A:  Call the office to receive refills.  Be aware that certain pain meds cannot be called into a pharmacy and actually require a paper prescription.  A change may be made in your pain med as you progress thru your recovery period or if you have side effects to certain meds.    --Pain meds are NOT refilled after 5pm on weekdays, and NOT AT ALL on the weekends, so please look ahead to prevent problems.      Q:  Why am I having a hard time sleeping now that I am at home?  A:  Many medications you receive while you are in the hospital can impact your sleep for a number of days after your surgery/hospitalization.  Decreased level of activity and naps during the day may also make sleeping at night difficult.  Try to minimize day-time naps, and get up frequently during the day to walk  around your home during your recovery time.  Sleep aides may be of some help, but are not recommended for long-term use.      Q:  I am having some back discomfort.  What should I do?  A:  This may be related to certain positioning that was required for your surgery, extended periods of time in bed, or other changes in your overall activity level.  You may try ice, heat, acetaminophen, or ibuprofen to treat this temporarily.  Note that many pain medications have acetaminophen in them and would state this on the prescription bottle.  Be sure not to exceed the maximum of 4000mg per day of acetaminophen.     **If the pain you are having does not resolve, is severe, or is a flare of back pain you have had on other occasions prior to surgery, please contact your primary physician for further recommendations or for an appointment to be examined at their office.    Q:  Why am I having headaches?  A:  Headaches can be caused by many things:  caffeine withdrawal, use of pain meds, dehydration, high blood pressure, lack of sleep, over-activity/exhaustion, flare-up of usual migraine headaches.  If you feel this is related to muscle tension (a band-like feeling around the head, or a pressure at the low-back of the head) you may try ice or heat to this area.  You may need to drink more fluids (try electrolyte drink like Gatorade), rest, or take your usual migraine medications.   **If your headaches do not resolve, worsen, are accompanied by other symptoms, or if your blood pressure is high, please call your primary physician for recommendation and/or examination.    Q:  I am unable to urinate.  What do I do?  A:  A small percentage of people can have difficulty urinating initially after surgery.  This includes being able to urinate only a very small amount at a time and feeling discomfort or pressure in the very low abdomen.  This is called  urinary retention , and is actually an urgent situation.  Proceed to your nearest  Emergency department for evaluation (not an Urgent Care Center).  Sometimes the bladder does not work correctly after certain medications you receive during surgery, or related to certain procedures.  You may need to have a catheter placed until your bladder recovers.  When planning to go to an Emergency department, it may help to call the ER to let them know you are coming in for this problem after a surgery.  This may help you get in quicker to be evaluated.  **If you have symptoms of a urinary tract infection, please contact your primary physician for the proper evaluation and treatment.    If you have other questions, please call the office Monday thru Friday between 8am and 5pm to discuss with the nurse or physician assistant.  #[phone]    There is a surgeon ON CALL on weekday evenings and over the weekend in case of urgent need only, and may be contacted at the same number.    If you are having an emergency, call 911 or proceed to your nearest emergency department.      GENERAL ANESTHESIA OR SEDATION ADULT DISCHARGE INSTRUCTIONS   SPECIAL PRECAUTIONS FOR 24 HOURS AFTER SURGERY    IT IS NOT UNUSUAL TO FEEL LIGHT-HEADED OR FAINT, UP TO 24 HOURS AFTER SURGERY OR WHILE TAKING PAIN MEDICATION.  IF YOU HAVE THESE SYMPTOMS; SIT FOR A FEW MINUTES BEFORE STANDING AND HAVE SOMEONE ASSIST YOU WHEN YOU GET UP TO WALK OR USE THE BATHROOM.    YOU SHOULD REST AND RELAX FOR THE NEXT 24 HOURS AND YOU MUST MAKE ARRANGEMENTS TO HAVE SOMEONE STAY WITH YOU FOR AT LEAST 24 HOURS AFTER YOUR DISCHARGE.  AVOID HAZARDOUS AND STRENUOUS ACTIVITIES.  DO NOT MAKE IMPORTANT DECISIONS FOR 24 HOURS.    DO NOT DRIVE ANY VEHICLE OR OPERATE MECHANICAL EQUIPMENT FOR 24 HOURS FOLLOWING THE END OF YOUR SURGERY.  EVEN THOUGH YOU MAY FEEL NORMAL, YOUR REACTIONS MAY BE AFFECTED BY THE MEDICATION YOU HAVE RECEIVED.    DO NOT DRINK ALCOHOLIC BEVERAGES FOR 24 HOURS FOLLOWING YOUR SURGERY.    DRINK CLEAR LIQUIDS (APPLE JUICE, GINGER ALE, 7-UP,  BROTH, ETC.).  PROGRESS TO YOUR REGULAR DIET AS YOU FEEL ABLE.    YOU MAY HAVE A DRY MOUTH, A SORE THROAT, MUSCLES ACHES OR TROUBLE SLEEPING.  THESE SHOULD GO AWAY AFTER 24 HOURS.    CALL YOUR DOCTOR FOR ANY OF THE FOLLOWING:  SIGNS OF INFECTION (FEVER, GROWING TENDERNESS AT THE SURGERY SITE, A LARGE AMOUNT OF DRAINAGE OR BLEEDING, SEVERE PAIN, FOUL-SMELLING DRAINAGE, REDNESS OR SWELLING.    IT HAS BEEN OVER 8 TO 10 HOURS SINCE SURGERY AND YOU ARE STILL NOT ABLE TO URINATE (PASS WATER).    You received Toradol, an IV form of ibuprofen (Motrin) at 3 pm.  Do not take any ibuprofen products until 9 pm.

## 2021-12-07 NOTE — ANESTHESIA CARE TRANSFER NOTE
Patient: Elfego Pickens    Procedure: Procedure(s):  CHOLECYSTECTOMY, LAPAROSCOPIC, WITH CHOLANGIOGRAM       Diagnosis: Choledocholithiasis [K80.50]  Diagnosis Additional Information: No value filed.    Anesthesia Type:   General     Note:    Oropharynx: oropharynx clear of all foreign objects  Level of Consciousness: awake  Oxygen Supplementation: face mask    Independent Airway: airway patency satisfactory and stable  Dentition: dentition unchanged  Vital Signs Stable: post-procedure vital signs reviewed and stable  Report to RN Given: handoff report given  Patient transferred to: PACU    Handoff Report: Identifed the Patient, Identified the Reponsible Provider, Reviewed the pertinent medical history, Discussed the surgical course, Reviewed Intra-OP anesthesia mangement and issues during anesthesia, Set expectations for post-procedure period and Allowed opportunity for questions and acknowledgement of understanding      Vitals:  Vitals Value Taken Time   BP     Temp     Pulse 84 12/07/21 1558   Resp 9 12/07/21 1558   SpO2     Vitals shown include unvalidated device data.    Electronically Signed By: BRO Lowry CRNA  December 7, 2021  3:58 PM

## 2021-12-07 NOTE — ANESTHESIA POSTPROCEDURE EVALUATION
Patient: Elfego Pickens    Procedure: Procedure(s):  CHOLECYSTECTOMY, LAPAROSCOPIC, WITH CHOLANGIOGRAM       Diagnosis:Choledocholithiasis [K80.50]  Diagnosis Additional Information: No value filed.    Anesthesia Type:  General    Note:  Disposition: Inpatient   Postop Pain Control: Uneventful            Sign Out: Well controlled pain   PONV: No   Neuro/Psych: Uneventful            Sign Out: Acceptable/Baseline neuro status   Airway/Respiratory: Uneventful            Sign Out: Acceptable/Baseline resp. status   CV/Hemodynamics: Uneventful            Sign Out: Acceptable CV status; No obvious hypovolemia; No obvious fluid overload   Other NRE: NONE   DID A NON-ROUTINE EVENT OCCUR? No           Last vitals:  Vitals Value Taken Time   BP     Temp     Pulse 80 12/07/21 1559   Resp 14 12/07/21 1559   SpO2 100 % 12/07/21 1559   Vitals shown include unvalidated device data.    Electronically Signed By: Luke Chambers MD  December 7, 2021  4:00 PM

## 2021-12-07 NOTE — ANESTHESIA PROCEDURE NOTES
Airway       Patient location during procedure: OR       Procedure Start/Stop Times: 12/7/2021 2:53 PM  Staff -        Performed By: CRNA  Consent for Airway        Urgency: elective  Indications and Patient Condition       Indications for airway management: herminia-procedural         Mask difficulty assessment: 1 - vent by mask    Final Airway Details       Final airway type: endotracheal airway       Successful airway: ETT - single  Endotracheal Airway Details        ETT size (mm): 8.0       Cuffed: yes       Successful intubation technique: direct laryngoscopy       DL Blade Type: Cesar 2       Grade View of Cords: 1       Adjucts: stylet       Position: Right       Measured from: gums/teeth       Secured at (cm): 22       Bite block used: None    Post intubation assessment        Placement verified by: capnometry        Number of attempts at approach: 1       Secured with: plastic tape       Ease of procedure: easy       Dentition: Intact

## 2021-12-07 NOTE — ANESTHESIA PREPROCEDURE EVALUATION
Anesthesia Pre-Procedure Evaluation    Patient: Elfego Pickens   MRN: [id number] : 1960        Preoperative Diagnosis: Choledocholithiasis [K80.50]    Procedure : Procedure(s):  CHOLECYSTECTOMY, LAPAROSCOPIC, WITH CHOLANGIOGRAM          Past Medical History:   Diagnosis Date     Benign essential hypertension      Hepatitis B infection      Hyperlipidemia LDL goal <100      Osteoarthritis      Paroxysmal atrial fibrillation (H)      Peripheral neuropathy       Past Surgical History:   Procedure Laterality Date     TOTAL KNEE ARTHROPLASTY Right       No Known Allergies   Social History     Tobacco Use     Smoking status: Never Smoker     Smokeless tobacco: Never Used   Substance Use Topics     Alcohol use: Yes     Comment: 3-4 drinks daily      Wt Readings from Last 1 Encounters:   21 113.4 kg (250 lb)        Anesthesia Evaluation   Pt has had prior anesthetic. Type: General.    No history of anesthetic complications       ROS/MED HX  ENT/Pulmonary:  - neg pulmonary ROS     Neurologic:  - neg neurologic ROS     Cardiovascular:     (+) hypertension-----dysrhythmias, a-fib,     METS/Exercise Tolerance:     Hematologic:  - neg hematologic  ROS     Musculoskeletal:  - neg musculoskeletal ROS     GI/Hepatic:     (+) cholecystitis/cholelithiasis, hepatitis type B, liver disease,     Renal/Genitourinary:     (+) renal disease,     Endo:     (+) Obesity,     Psychiatric/Substance Use:  - neg psychiatric ROS     Infectious Disease:  - neg infectious disease ROS     Malignancy:       Other:            Physical Exam    Airway        Mallampati: II   TM distance: > 3 FB   Neck ROM: full   Mouth opening: > 3 cm    Respiratory Devices and Support         Dental  no notable dental history         Cardiovascular   cardiovascular exam normal          Pulmonary   pulmonary exam normal                OUTSIDE LABS:  CBC:   Lab Results   Component Value Date    WBC 6.3 2021    WBC 15.9 (H) 2021    HGB 13.0 (L)  11/27/2021    HGB 13.7 11/25/2021    HCT 39.0 (L) 11/27/2021    HCT 40.5 11/25/2021     11/27/2021     11/25/2021     BMP:   Lab Results   Component Value Date     11/27/2021     11/25/2021    POTASSIUM 3.4 11/27/2021    POTASSIUM 3.7 11/25/2021    CHLORIDE 100 11/27/2021    CHLORIDE 100 11/25/2021    CO2 28 11/27/2021    CO2 26 11/25/2021    BUN 6 (L) 11/27/2021    BUN 14 11/25/2021    CR 0.70 11/27/2021    CR 0.88 11/25/2021     (H) 11/27/2021     (H) 11/25/2021     COAGS: No results found for: PTT, INR, FIBR  POC:   Lab Results   Component Value Date     (H) 06/06/2021     HEPATIC:   Lab Results   Component Value Date    ALBUMIN 3.0 (L) 11/27/2021    PROTTOTAL 6.9 11/27/2021     (H) 11/27/2021     (H) 11/27/2021    ALKPHOS 140 11/27/2021    BILITOTAL 5.2 (H) 11/27/2021     OTHER:   Lab Results   Component Value Date    LACT 2.0 06/06/2021    MEENAKSHI 9.0 11/27/2021    PHOS 4.2 11/26/2021    MAG 1.7 11/26/2021    LIPASE 365 11/25/2021       Anesthesia Plan    ASA Status:  3      Anesthesia Type: General.     - Airway: ETT   Induction: Intravenous.   Maintenance: Balanced.        Consents    Anesthesia Plan(s) and associated risks, benefits, and realistic alternatives discussed. Questions answered and patient/representative(s) expressed understanding.    - Discussed:     - Discussed with:  Patient      - Extended Intubation/Ventilatory Support Discussed: No.      - Patient is DNR/DNI Status: No    Use of blood products discussed: No .     Postoperative Care    Pain management: Oral pain medications, IV analgesics.   PONV prophylaxis: Ondansetron (or other 5HT-3), Dexamethasone or Solumedrol     Comments:                Jacky Harp MD

## 2021-12-07 NOTE — OP NOTE
SURGEON: Abigail Rodriguez MD   ASSISTANT: Kim Murray PA-C the physician assistant was medically necessary to assist in prepping, positioning, camera operation, retraction/exposure and closure of the port site.   Medical student: Katie Clark  PREOPERATIVE DIAGNOSIS: symptomatic cholelithiasis and choledocholithiases, stone passed per MRCP and clinically.   POSTOPERATIVE DIAGNOSIS:  symptomatic cholelithiasis and choledocholithiases, stone passed per MRCP and clinically.    PROCEDURE: Laparoscopic cholecystectomy with intraoperative cholangiograms.  PREOPERATIVE MEDICATIONS: Ancef 2 gm   INDICATION:  Elfego Pickens is a 61 year old male who has symptomatic cholelithiasis and choledocholithiases, stone passed per MRCP and clinically.  A laparoscopic cholecystectomy is recommended.  The risks and benefits of the procedure, including the risk of bleeding, hernia, infection, possible need to perform an open cholecystectomy, bile leak or bile duct or bowel injury  have been discussed with the patient.  He agrees with proceeding.  Cholangiograms are indicated due to the need to clear the duct due to his history of a common duct stone.  PROCEDURE: The patient was placed supine. Abdomen prepped and draped in the usual sterile fashion.  Initial trocar site was in the infraumbilical location, and the abdomen was entered using Claire trocar technique. A pneumoperitoneum was established, and the camera was positioned within the peritoneal cavity. The remaining trocars were then placed under direct vision, two 5 mm at the right anterior axillary line, and a 5 mm at the subxiphoid. The gallbladder had filmy omental adhesions.  These were taken down bluntly.   The gallbladder was grasped and placed under traction using the two lateral sites.  The dissection began using  the subxiphoid port. The cystic duct was freed circumferentially. After confirmation of the anatomy by dissecting the Hepato-cystic triangle, the cystic  duct was clipped near the gallbladder neck.  A cystic-ductotomy was made and the bile was clear.   The cholangiocath was advanced and secured with a clip.  Intra-operative cholangiograms showed a normal duct, no filling defects and prompt drainage into the duodenum.   The cholangiocath was then removed and the cystic duct was triply clipped and divided. The cystic artery was directly posterior and was triply clipped and divided, both anterior and posterior branches. The gallbladder was removed from the gallbladder bed using coag cautery. Once the remaining attachments were divided, the gallbladder was removed thru the infraumbilical site without leakage of bile or stones. The camera was then repositioned and the bed inspected. The bed was dry and the clips in good position without leakage of blood or bile.    The  trocar sites were infiltrated with 0.25% Marcaine for pain control and removed under direct vision. The infraumbilical site was closed with interrupted 0 Vicryl for the fascia, and all skin sites closed with 4-0 subcuticular Monocryl. The patient transferred to recovery in good condition.   ESTIMATED BLOOD LOSS: 10 cc.   INTRAOPERATIVE FINDINGS: Chronic Cholecysititis                                                          Cholangiograms showed normal anatomy and no filling defects.

## 2021-12-09 LAB
PATH REPORT.COMMENTS IMP SPEC: NORMAL
PATH REPORT.COMMENTS IMP SPEC: NORMAL
PATH REPORT.FINAL DX SPEC: NORMAL
PATH REPORT.GROSS SPEC: NORMAL
PATH REPORT.MICROSCOPIC SPEC OTHER STN: NORMAL
PATH REPORT.RELEVANT HX SPEC: NORMAL
PHOTO IMAGE: NORMAL

## 2021-12-10 NOTE — RESULT ENCOUNTER NOTE
These results are as expected and will be discussed at the follow up visit or call.  Abigail Rodriguez MD

## 2021-12-27 ENCOUNTER — TELEPHONE (OUTPATIENT)
Dept: SURGERY | Facility: CLINIC | Age: 61
End: 2021-12-27
Payer: COMMERCIAL

## 2021-12-27 NOTE — TELEPHONE ENCOUNTER
[facility]   Postoperative Follow-up Phone Call  -Call to patient to review recent procedure and recovery    Attempted to call patient for post op check.  No answer.  Message was left for patient to call back if they had any questions of concerns.     Kim Murray PA-C

## (undated) DEVICE — CLIP APPLIER ENDO 5MM M/L LIGAMAX EL5ML

## (undated) DEVICE — CONNECTOR STOPCOCK 3 WAY MALE LL HI-FLO MX9311L

## (undated) DEVICE — LINEN POUCH DBL 5427

## (undated) DEVICE — TUBING IV EXTENSION SET ANESTHESIA 34" MLL 2C6227

## (undated) DEVICE — SPECIMEN CONTAINER 4OZ

## (undated) DEVICE — ESU ELEC BLADE 2.75" COATED/INSULATED E1455

## (undated) DEVICE — PREP POVIDONE-IODINE 7.5% SCRUB 4OZ BOTTLE MDS093945

## (undated) DEVICE — DECANTER VIAL 2006S

## (undated) DEVICE — ENDO TROCAR FIRST ENTRY KII FIOS Z-THRD 05X100MM CTF03

## (undated) DEVICE — LINEN TOWEL PACK X5 5464

## (undated) DEVICE — SU MONOCRYL 4-0 PS-2 27" UND Y426H

## (undated) DEVICE — DRAPE LEGGINGS 8421

## (undated) DEVICE — SU VICRYL 0 CT-2 CR 8X18" J727D

## (undated) DEVICE — RAD RX ISOVUE 300 (50ML) 61% IOPAMIDOL CHARGE PER ML

## (undated) DEVICE — GLOVE PROTEXIS POWDER FREE 6.5 ORTHOPEDIC 2D73ET65

## (undated) DEVICE — DRAPE C-ARM 60X42" 1013

## (undated) DEVICE — PREP POVIDONE IODINE SOLUTION 10% 4OZ BOTTLE 29906-004

## (undated) DEVICE — SOL NACL 0.9% IRRIG 3000ML BAG 2B7477

## (undated) DEVICE — GLOVE PROTEXIS BLUE W/NEU-THERA 6.5  2D73EB65

## (undated) DEVICE — DECANTER BAG 2002S

## (undated) DEVICE — PREP SKIN SCRUB TRAY 4461A

## (undated) DEVICE — CATH CHOLANGIOGRAM 4.5FR TAUT METAL TIP 20018-M55

## (undated) DEVICE — SYR 30ML LL W/O NDL 302832

## (undated) DEVICE — ESU CORD MONOPOLAR 10'  E0510

## (undated) DEVICE — ESU GROUND PAD ADULT W/CORD E7507

## (undated) DEVICE — ESU PENCIL W/HOLSTER E2350H

## (undated) DEVICE — BLADE CLIPPER 3M 9670

## (undated) DEVICE — EVAC SYSTEM CLEAR FLOW SC082500

## (undated) DEVICE — LINEN FULL SHEET 5511

## (undated) DEVICE — Device

## (undated) DEVICE — BAG CLEAR TRASH 1.3M 39X33" P4040C

## (undated) DEVICE — CATH IV ANGIO INTRO 12GA 382277

## (undated) DEVICE — ENDO TROCAR OPTICAL ACCESS KII Z-THRD 12X100MM C0R85

## (undated) DEVICE — SOL NACL 0.9% INJ 250ML BAG 2B1322Q

## (undated) DEVICE — LINEN HALF SHEET 5512

## (undated) DEVICE — ENDO TROCAR SLEEVE KII Z-THREADED 05X100MM CTS02

## (undated) RX ORDER — CEFAZOLIN SODIUM IN 0.9 % NACL 3 G/100 ML
INTRAVENOUS SOLUTION, PIGGYBACK (ML) INTRAVENOUS
Status: DISPENSED
Start: 2021-12-07

## (undated) RX ORDER — PROPOFOL 10 MG/ML
INJECTION, EMULSION INTRAVENOUS
Status: DISPENSED
Start: 2021-12-07

## (undated) RX ORDER — DEXAMETHASONE SODIUM PHOSPHATE 4 MG/ML
INJECTION, SOLUTION INTRA-ARTICULAR; INTRALESIONAL; INTRAMUSCULAR; INTRAVENOUS; SOFT TISSUE
Status: DISPENSED
Start: 2021-12-07

## (undated) RX ORDER — FENTANYL CITRATE 50 UG/ML
INJECTION, SOLUTION INTRAMUSCULAR; INTRAVENOUS
Status: DISPENSED
Start: 2021-12-07

## (undated) RX ORDER — LIDOCAINE HYDROCHLORIDE 10 MG/ML
INJECTION, SOLUTION EPIDURAL; INFILTRATION; INTRACAUDAL; PERINEURAL
Status: DISPENSED
Start: 2021-12-07

## (undated) RX ORDER — GLYCOPYRROLATE 0.2 MG/ML
INJECTION INTRAMUSCULAR; INTRAVENOUS
Status: DISPENSED
Start: 2021-12-07

## (undated) RX ORDER — OXYCODONE AND ACETAMINOPHEN 5; 325 MG/1; MG/1
TABLET ORAL
Status: DISPENSED
Start: 2021-12-07

## (undated) RX ORDER — ONDANSETRON 2 MG/ML
INJECTION INTRAMUSCULAR; INTRAVENOUS
Status: DISPENSED
Start: 2021-12-07

## (undated) RX ORDER — FENTANYL CITRATE-0.9 % NACL/PF 10 MCG/ML
PLASTIC BAG, INJECTION (ML) INTRAVENOUS
Status: DISPENSED
Start: 2021-12-07